# Patient Record
Sex: FEMALE | Race: WHITE | NOT HISPANIC OR LATINO | Employment: UNEMPLOYED | URBAN - METROPOLITAN AREA
[De-identification: names, ages, dates, MRNs, and addresses within clinical notes are randomized per-mention and may not be internally consistent; named-entity substitution may affect disease eponyms.]

---

## 2017-04-26 ENCOUNTER — TELEPHONE (OUTPATIENT)
Dept: OBSTETRICS AND GYNECOLOGY | Facility: CLINIC | Age: 52
End: 2017-04-26

## 2017-04-26 NOTE — TELEPHONE ENCOUNTER
Left a voice msg for pt to call [facility] regarding 6 month follow up appt from 8/4/16 Mammogram. Also called [facility] scheduling to see if they have reached pt yet. They have not heard from her to schedule follow up.

## 2018-10-25 ENCOUNTER — OFFICE VISIT (OUTPATIENT)
Dept: OBSTETRICS AND GYNECOLOGY | Facility: CLINIC | Age: 53
End: 2018-10-25

## 2018-10-25 VITALS
DIASTOLIC BLOOD PRESSURE: 84 MMHG | WEIGHT: 270.6 LBS | BODY MASS INDEX: 42.47 KG/M2 | SYSTOLIC BLOOD PRESSURE: 128 MMHG | HEIGHT: 67 IN

## 2018-10-25 DIAGNOSIS — Z78.0 MENOPAUSE: Primary | ICD-10-CM

## 2018-10-25 DIAGNOSIS — Z01.419 ENCOUNTER FOR GYNECOLOGICAL EXAMINATION WITHOUT ABNORMAL FINDING: ICD-10-CM

## 2018-10-25 PROCEDURE — G0101 CA SCREEN;PELVIC/BREAST EXAM: HCPCS | Performed by: OBSTETRICS & GYNECOLOGY

## 2018-10-25 RX ORDER — CYCLOBENZAPRINE HCL 5 MG
1 TABLET ORAL NIGHTLY
Refills: 0 | COMMUNITY
Start: 2018-10-03

## 2018-10-25 NOTE — PROGRESS NOTES
Chief Complaint   Patient presents with   • Gynecologic Exam       Nighat Oden is a 53 y.o. year old  presenting to be seen for her annual exam.  This patient has a history of a prior abdominal hysterectomy/right salpingo-oophorectomy.  She has had a left salpingo-oophorectomy.  She has had a cholecystectomy.  She does not take estrogen replacement therapy.  She denies menopausal symptoms.  She denies bowel or urinary symptoms.  She is treated for type 2 diabetes mellitus, hypertension, and dyslipidemia.    SCREENING TESTS    Year 2012 2016 2017   Age                         PAP                         HPV high risk                         Mammogram      benign                   CHLOÉ score                         Breast MRI                         Lipids                         Vitamin D                         Colonoscopy                         DEXA  Frax (hip/any)                         Ovarian Screen                             She exercises regularly: no.  She wears her seat belt: yes.  She has concerns about domestic violence: no.  She has noticed changes in height: no    GYN screening history:  · Last mammogram: was done on approximately 2017 and the result was: Birads II (Benign findings)..    No Additional Complaints Reported    The following portions of the patient's history were reviewed and updated as appropriate:vital signs and   She  has a past medical history of Disc disorder of thoracic region; Family history of breast cancer in mother; Menopause; Obesity; Osteoarthritis; and Sleep apnea with use of continuous positive airway pressure (CPAP).  She  does not have any pertinent problems on file.  She  has a past surgical history that includes Total abdominal hysterectomy w/ bilateral salpingoophorectomy (Right); Salpingoophorectomy (Left); Cervical discectomy; Cholecystectomy; and Breast  "biopsy.  Her family history includes Breast cancer in her mother; Cancer in her father; Diabetes in her father and mother; Pulmonary embolism in her father.  She  reports that she has quit smoking. She has never used smokeless tobacco. She reports that she does not drink alcohol or use drugs.  Current Outpatient Prescriptions   Medication Sig Dispense Refill   • Blood Glucose Monitoring Suppl (ONE TOUCH ULTRA MINI) W/DEVICE kit   0   • cyclobenzaprine (FLEXERIL) 5 MG tablet Take 1 tablet by mouth Every Night.  0   • EASY TOUCH LANCETS 30G/TWIST misc   3   • glimepiride (AMARYL) 4 MG tablet Take 1 tablet by mouth Daily.  3   • HYDROcodone-acetaminophen (NORCO) 7.5-325 MG per tablet Take 1 tablet by mouth As Needed.  0   • lisinopril (PRINIVIL,ZESTRIL) 5 MG tablet Take 1 tablet by mouth Daily.  3   • metFORMIN (GLUCOPHAGE) 500 MG tablet Take 1 tablet by mouth 2 (Two) Times a Day.  3   • ONE TOUCH ULTRA TEST test strip   3   • simvastatin (ZOCOR) 20 MG tablet Take 1 tablet by mouth Daily.  3     No current facility-administered medications for this visit.      She has No Known Allergies..    Review of Systems  A comprehensive review of systems was taken.  Constitutional: negative for fever, chills, activity change, appetite change, fatigue and unexpected weight change.  Respiratory: negative  Cardiovascular: negative  Gastrointestinal: negative  Genitourinary:negative  Musculoskeletal:positive for back pain  Behavioral/Psych: negative       /84   Ht 170.2 cm (67\")   Wt 123 kg (270 lb 9.6 oz)   LMP  (LMP Unknown)   BMI 42.38 kg/m²     Physical Exam    General:  alert; cooperative; well developed; well nourished  obese - Body mass index is 42.38 kg/m².   Skin:  No suspicious lesions seen   Thyroid: normal to inspection and palpation   Lungs:  clear to auscultation bilaterally   Heart:  regular rate and rhythm, S1, S2 normal, no murmur, click, rub or gallop   Breasts:  Examined in supine position  Symmetric " without masses or skin dimpling  Nipples normal without inversion, lesions or discharge  There are no palpable axillary nodes   Abdomen: soft, non-tender; no masses  no umbilical or inginual hernias are present  no hepato-splenomegaly   Pelvis: Clinical staff was present for exam  External genitalia:  normal appearance of the external genitalia including Bartholin's and [place]'s glands.  Vaginal:  normal pink mucosa without prolapse or lesions.  Cervix:  absent.  Uterus:  absent.  Adnexa:  absent, bilateral.  Rectal:  anus visually normal appearing. recto-vaginal exam unremarkable and confirms findings;     Lab Review   No data reviewed    Imaging  Mammogram results         ASSESSMENT  Problems Addressed this Visit        Genitourinary    Menopause - Primary      Other Visit Diagnoses     Encounter for gynecological examination without abnormal finding              PLAN    · Medications prescribed this encounter:  No orders of the defined types were placed in this encounter.  · Monthly self breast assessment and annual breast imaging  · Calcium, 600 mg/ Vit. D, 400 IU daily; regular weight-bearing exercise  · Follow up: 12 month(s)  *Please note that portions of this documentation may have been completed with a voice recognition program.  Efforts were made to edit this dictation, but occasional words may have been mistranscribed.       This note was electronically signed.    RAMBO Yi MD  October 25, 2018  4:13 PM

## 2022-03-04 ENCOUNTER — APPOINTMENT (OUTPATIENT)
Dept: GENERAL RADIOLOGY | Facility: HOSPITAL | Age: 57
End: 2022-03-04

## 2022-03-04 ENCOUNTER — HOSPITAL ENCOUNTER (INPATIENT)
Facility: HOSPITAL | Age: 57
LOS: 2 days | Discharge: HOME | End: 2022-03-08
Attending: EMERGENCY MEDICINE | Admitting: INTERNAL MEDICINE

## 2022-03-04 ENCOUNTER — APPOINTMENT (OUTPATIENT)
Dept: CT IMAGING | Facility: HOSPITAL | Age: 57
End: 2022-03-04

## 2022-03-04 DIAGNOSIS — Z86.39 HISTORY OF DIABETES MELLITUS: ICD-10-CM

## 2022-03-04 DIAGNOSIS — Z86.79 HISTORY OF HYPERTENSION: ICD-10-CM

## 2022-03-04 DIAGNOSIS — Z86.39 HISTORY OF HYPERLIPIDEMIA: ICD-10-CM

## 2022-03-04 DIAGNOSIS — Z82.49 FAMILY HISTORY OF CORONARY ARTERY DISEASE: ICD-10-CM

## 2022-03-04 DIAGNOSIS — R20.0 NUMBNESS AND TINGLING OF LEFT UPPER AND LOWER EXTREMITY: ICD-10-CM

## 2022-03-04 DIAGNOSIS — Z86.39 HISTORY OF OBESITY: ICD-10-CM

## 2022-03-04 DIAGNOSIS — R55 NEAR SYNCOPE: ICD-10-CM

## 2022-03-04 DIAGNOSIS — R06.02 SHORTNESS OF BREATH: ICD-10-CM

## 2022-03-04 DIAGNOSIS — R20.2 NUMBNESS AND TINGLING OF LEFT UPPER AND LOWER EXTREMITY: ICD-10-CM

## 2022-03-04 DIAGNOSIS — R07.89 ATYPICAL CHEST PAIN: Primary | ICD-10-CM

## 2022-03-04 PROBLEM — R07.9 CHEST PAIN: Status: ACTIVE | Noted: 2022-03-04

## 2022-03-04 PROBLEM — I10 ESSENTIAL HYPERTENSION: Status: ACTIVE | Noted: 2022-03-04

## 2022-03-04 PROBLEM — E78.5 HYPERLIPIDEMIA: Status: ACTIVE | Noted: 2022-03-04

## 2022-03-04 PROBLEM — E11.9 TYPE 2 DIABETES MELLITUS: Status: ACTIVE | Noted: 2022-03-04

## 2022-03-04 PROBLEM — E83.52 HYPERCALCEMIA: Status: ACTIVE | Noted: 2022-03-04

## 2022-03-04 PROBLEM — R74.8 ELEVATED LIVER ENZYMES: Status: ACTIVE | Noted: 2022-03-04

## 2022-03-04 LAB
ALBUMIN SERPL-MCNC: 4.9 G/DL (ref 3.5–5.2)
ALBUMIN/GLOB SERPL: 2 G/DL
ALP SERPL-CCNC: 100 U/L (ref 39–117)
ALT SERPL W P-5'-P-CCNC: 47 U/L (ref 1–33)
ANION GAP SERPL CALCULATED.3IONS-SCNC: 11 MMOL/L (ref 5–15)
AST SERPL-CCNC: 33 U/L (ref 1–32)
BASOPHILS # BLD AUTO: 0.04 10*3/MM3 (ref 0–0.2)
BASOPHILS NFR BLD AUTO: 0.4 % (ref 0–1.5)
BILIRUB SERPL-MCNC: <0.2 MG/DL (ref 0–1.2)
BUN SERPL-MCNC: 10 MG/DL (ref 6–20)
BUN/CREAT SERPL: 12.7 (ref 7–25)
CA-I SERPL ISE-MCNC: 1.42 MMOL/L (ref 1.12–1.32)
CALCIUM SPEC-SCNC: 10.6 MG/DL (ref 8.6–10.5)
CHLORIDE SERPL-SCNC: 106 MMOL/L (ref 98–107)
CK SERPL-CCNC: 70 U/L (ref 20–180)
CO2 SERPL-SCNC: 25 MMOL/L (ref 22–29)
CREAT SERPL-MCNC: 0.79 MG/DL (ref 0.57–1)
DEPRECATED RDW RBC AUTO: 39 FL (ref 37–54)
EGFRCR SERPLBLD CKD-EPI 2021: 88.5 ML/MIN/1.73
EOSINOPHIL # BLD AUTO: 0.15 10*3/MM3 (ref 0–0.4)
EOSINOPHIL NFR BLD AUTO: 1.6 % (ref 0.3–6.2)
ERYTHROCYTE [DISTWIDTH] IN BLOOD BY AUTOMATED COUNT: 12.4 % (ref 12.3–15.4)
FLUAV SUBTYP SPEC NAA+PROBE: NOT DETECTED
FLUBV RNA ISLT QL NAA+PROBE: NOT DETECTED
GLOBULIN UR ELPH-MCNC: 2.4 GM/DL
GLUCOSE SERPL-MCNC: 116 MG/DL (ref 65–99)
HBA1C MFR BLD: 8.8 % (ref 4.8–5.6)
HCT VFR BLD AUTO: 44.1 % (ref 34–46.6)
HGB BLD-MCNC: 14.6 G/DL (ref 12–15.9)
HOLD SPECIMEN: NORMAL
IMM GRANULOCYTES # BLD AUTO: 0.02 10*3/MM3 (ref 0–0.05)
IMM GRANULOCYTES NFR BLD AUTO: 0.2 % (ref 0–0.5)
LIPASE SERPL-CCNC: 24 U/L (ref 13–60)
LYMPHOCYTES # BLD AUTO: 3.26 10*3/MM3 (ref 0.7–3.1)
LYMPHOCYTES NFR BLD AUTO: 34.7 % (ref 19.6–45.3)
MAGNESIUM SERPL-MCNC: 2.2 MG/DL (ref 1.6–2.6)
MCH RBC QN AUTO: 28.6 PG (ref 26.6–33)
MCHC RBC AUTO-ENTMCNC: 33.1 G/DL (ref 31.5–35.7)
MCV RBC AUTO: 86.5 FL (ref 79–97)
MONOCYTES # BLD AUTO: 0.55 10*3/MM3 (ref 0.1–0.9)
MONOCYTES NFR BLD AUTO: 5.9 % (ref 5–12)
NEUTROPHILS NFR BLD AUTO: 5.38 10*3/MM3 (ref 1.7–7)
NEUTROPHILS NFR BLD AUTO: 57.2 % (ref 42.7–76)
NRBC BLD AUTO-RTO: 0 /100 WBC (ref 0–0.2)
NT-PROBNP SERPL-MCNC: 8.9 PG/ML (ref 0–900)
PLATELET # BLD AUTO: 233 10*3/MM3 (ref 140–450)
PMV BLD AUTO: 9 FL (ref 6–12)
POTASSIUM SERPL-SCNC: 4.6 MMOL/L (ref 3.5–5.2)
PROT SERPL-MCNC: 7.3 G/DL (ref 6–8.5)
RBC # BLD AUTO: 5.1 10*6/MM3 (ref 3.77–5.28)
SARS-COV-2 RNA PNL SPEC NAA+PROBE: NOT DETECTED
SODIUM SERPL-SCNC: 142 MMOL/L (ref 136–145)
TROPONIN T SERPL-MCNC: <0.01 NG/ML (ref 0–0.03)
TROPONIN T SERPL-MCNC: <0.01 NG/ML (ref 0–0.03)
TSH SERPL DL<=0.05 MIU/L-ACNC: 1.64 UIU/ML (ref 0.27–4.2)
WBC NRBC COR # BLD: 9.4 10*3/MM3 (ref 3.4–10.8)
WHOLE BLOOD HOLD SPECIMEN: NORMAL
WHOLE BLOOD HOLD SPECIMEN: NORMAL

## 2022-03-04 PROCEDURE — 70450 CT HEAD/BRAIN W/O DYE: CPT

## 2022-03-04 PROCEDURE — 83690 ASSAY OF LIPASE: CPT | Performed by: EMERGENCY MEDICINE

## 2022-03-04 PROCEDURE — 84484 ASSAY OF TROPONIN QUANT: CPT | Performed by: EMERGENCY MEDICINE

## 2022-03-04 PROCEDURE — 0 IOPAMIDOL PER 1 ML: Performed by: EMERGENCY MEDICINE

## 2022-03-04 PROCEDURE — 71275 CT ANGIOGRAPHY CHEST: CPT

## 2022-03-04 PROCEDURE — 82330 ASSAY OF CALCIUM: CPT | Performed by: PHYSICIAN ASSISTANT

## 2022-03-04 PROCEDURE — 87636 SARSCOV2 & INF A&B AMP PRB: CPT | Performed by: PHYSICIAN ASSISTANT

## 2022-03-04 PROCEDURE — 71045 X-RAY EXAM CHEST 1 VIEW: CPT

## 2022-03-04 PROCEDURE — 84443 ASSAY THYROID STIM HORMONE: CPT | Performed by: PHYSICIAN ASSISTANT

## 2022-03-04 PROCEDURE — 99223 1ST HOSP IP/OBS HIGH 75: CPT | Performed by: PHYSICIAN ASSISTANT

## 2022-03-04 PROCEDURE — 99285 EMERGENCY DEPT VISIT HI MDM: CPT

## 2022-03-04 PROCEDURE — 83735 ASSAY OF MAGNESIUM: CPT | Performed by: PHYSICIAN ASSISTANT

## 2022-03-04 PROCEDURE — 83036 HEMOGLOBIN GLYCOSYLATED A1C: CPT | Performed by: PHYSICIAN ASSISTANT

## 2022-03-04 PROCEDURE — 93005 ELECTROCARDIOGRAM TRACING: CPT | Performed by: EMERGENCY MEDICINE

## 2022-03-04 PROCEDURE — 99284 EMERGENCY DEPT VISIT MOD MDM: CPT

## 2022-03-04 PROCEDURE — 82550 ASSAY OF CK (CPK): CPT | Performed by: PHYSICIAN ASSISTANT

## 2022-03-04 PROCEDURE — 83880 ASSAY OF NATRIURETIC PEPTIDE: CPT | Performed by: EMERGENCY MEDICINE

## 2022-03-04 PROCEDURE — 85025 COMPLETE CBC W/AUTO DIFF WBC: CPT

## 2022-03-04 PROCEDURE — G0378 HOSPITAL OBSERVATION PER HR: HCPCS

## 2022-03-04 PROCEDURE — 80053 COMPREHEN METABOLIC PANEL: CPT | Performed by: EMERGENCY MEDICINE

## 2022-03-04 RX ORDER — ASPIRIN 81 MG/1
324 TABLET, CHEWABLE ORAL ONCE
Status: COMPLETED | OUTPATIENT
Start: 2022-03-04 | End: 2022-03-04

## 2022-03-04 RX ORDER — SODIUM CHLORIDE 0.9 % (FLUSH) 0.9 %
10 SYRINGE (ML) INJECTION AS NEEDED
Status: DISCONTINUED | OUTPATIENT
Start: 2022-03-04 | End: 2022-03-08 | Stop reason: HOSPADM

## 2022-03-04 RX ADMIN — ASPIRIN 81 MG 243 MG: 81 TABLET ORAL at 20:12

## 2022-03-04 RX ADMIN — IOPAMIDOL 69 ML: 755 INJECTION, SOLUTION INTRAVENOUS at 21:13

## 2022-03-05 ENCOUNTER — APPOINTMENT (OUTPATIENT)
Dept: CARDIOLOGY | Facility: HOSPITAL | Age: 57
End: 2022-03-05

## 2022-03-05 LAB
ANION GAP SERPL CALCULATED.3IONS-SCNC: 12 MMOL/L (ref 5–15)
BASOPHILS # BLD AUTO: 0.03 10*3/MM3 (ref 0–0.2)
BASOPHILS NFR BLD AUTO: 0.4 % (ref 0–1.5)
BH CV ECHO MEAS - AO MAX PG (FULL): 0.4 MMHG
BH CV ECHO MEAS - AO MAX PG: 5 MMHG
BH CV ECHO MEAS - AO MEAN PG (FULL): 0.64 MMHG
BH CV ECHO MEAS - AO MEAN PG: 3.3 MMHG
BH CV ECHO MEAS - AO ROOT AREA (BSA CORRECTED): 1.1
BH CV ECHO MEAS - AO ROOT AREA: 4.7 CM^2
BH CV ECHO MEAS - AO ROOT DIAM: 2.4 CM
BH CV ECHO MEAS - AO V2 MAX: 115.8 CM/SEC
BH CV ECHO MEAS - AO V2 MEAN: 86.6 CM/SEC
BH CV ECHO MEAS - AO V2 VTI: 21.3 CM
BH CV ECHO MEAS - AVA(I,A): 3.2 CM^2
BH CV ECHO MEAS - AVA(I,D): 3.2 CM^2
BH CV ECHO MEAS - AVA(V,A): 3 CM^2
BH CV ECHO MEAS - AVA(V,D): 3 CM^2
BH CV ECHO MEAS - BSA(HAYCOCK): 2.4 M^2
BH CV ECHO MEAS - BSA: 2.2 M^2
BH CV ECHO MEAS - BZI_BMI: 39.8 KILOGRAMS/M^2
BH CV ECHO MEAS - BZI_METRIC_HEIGHT: 170.2 CM
BH CV ECHO MEAS - BZI_METRIC_WEIGHT: 115.2 KG
BH CV ECHO MEAS - EDV(CUBED): 77.8 ML
BH CV ECHO MEAS - EDV(TEICH): 81.7 ML
BH CV ECHO MEAS - EF(CUBED): 62.9 %
BH CV ECHO MEAS - EF(TEICH): 54.8 %
BH CV ECHO MEAS - ESV(CUBED): 28.8 ML
BH CV ECHO MEAS - ESV(TEICH): 36.9 ML
BH CV ECHO MEAS - FS: 28.2 %
BH CV ECHO MEAS - IVS/LVPW: 1
BH CV ECHO MEAS - IVSD: 1.1 CM
BH CV ECHO MEAS - LA DIMENSION: 2.8 CM
BH CV ECHO MEAS - LA/AO: 1.1
BH CV ECHO MEAS - LAT PEAK E' VEL: 9.4 CM/SEC
BH CV ECHO MEAS - LATERAL E/E' RATIO: 6.9
BH CV ECHO MEAS - LV MASS(C)D: 153.3 GRAMS
BH CV ECHO MEAS - LV MASS(C)DI: 68.5 GRAMS/M^2
BH CV ECHO MEAS - LV MAX PG: 4.6 MMHG
BH CV ECHO MEAS - LV MEAN PG: 2.7 MMHG
BH CV ECHO MEAS - LV V1 MAX: 107.2 CM/SEC
BH CV ECHO MEAS - LV V1 MEAN: 76.6 CM/SEC
BH CV ECHO MEAS - LV V1 VTI: 21.2 CM
BH CV ECHO MEAS - LVIDD: 4.3 CM
BH CV ECHO MEAS - LVIDS: 3.1 CM
BH CV ECHO MEAS - LVOT AREA (M): 3.1 CM^2
BH CV ECHO MEAS - LVOT AREA: 3.2 CM^2
BH CV ECHO MEAS - LVOT DIAM: 2 CM
BH CV ECHO MEAS - LVPWD: 1.1 CM
BH CV ECHO MEAS - MED PEAK E' VEL: 8.1 CM/SEC
BH CV ECHO MEAS - MEDIAL E/E' RATIO: 8
BH CV ECHO MEAS - MV A MAX VEL: 61.3 CM/SEC
BH CV ECHO MEAS - MV DEC SLOPE: 357.3 CM/SEC^2
BH CV ECHO MEAS - MV DEC TIME: 0.21 SEC
BH CV ECHO MEAS - MV E MAX VEL: 64.7 CM/SEC
BH CV ECHO MEAS - MV E/A: 1.1
BH CV ECHO MEAS - MV MAX PG: 2.7 MMHG
BH CV ECHO MEAS - MV MEAN PG: 1.2 MMHG
BH CV ECHO MEAS - MV P1/2T MAX VEL: 78.7 CM/SEC
BH CV ECHO MEAS - MV P1/2T: 64.5 MSEC
BH CV ECHO MEAS - MV V2 MAX: 81.4 CM/SEC
BH CV ECHO MEAS - MV V2 MEAN: 49.7 CM/SEC
BH CV ECHO MEAS - MV V2 VTI: 21.8 CM
BH CV ECHO MEAS - MVA P1/2T LCG: 2.8 CM^2
BH CV ECHO MEAS - MVA(P1/2T): 3.4 CM^2
BH CV ECHO MEAS - MVA(VTI): 3.1 CM^2
BH CV ECHO MEAS - PA ACC TIME: 0.08 SEC
BH CV ECHO MEAS - PA PR(ACCEL): 43.3 MMHG
BH CV ECHO MEAS - SI(AO): 44.8 ML/M^2
BH CV ECHO MEAS - SI(CUBED): 21.9 ML/M^2
BH CV ECHO MEAS - SI(LVOT): 30.5 ML/M^2
BH CV ECHO MEAS - SI(TEICH): 20 ML/M^2
BH CV ECHO MEAS - SV(AO): 100.2 ML
BH CV ECHO MEAS - SV(CUBED): 49 ML
BH CV ECHO MEAS - SV(LVOT): 68.2 ML
BH CV ECHO MEAS - SV(TEICH): 44.7 ML
BH CV ECHO MEAS - TAPSE (>1.6): 2 CM
BH CV ECHO MEASUREMENTS AVERAGE E/E' RATIO: 7.39
BH CV XLRA - RV BASE: 3.1 CM
BH CV XLRA - RV LENGTH: 5.2 CM
BH CV XLRA - RV MID: 2.5 CM
BH CV XLRA - TDI S': 16.2 CM/SEC
BH CV XLRA MEAS LEFT DIST CCA EDV: 20.4 CM/SEC
BH CV XLRA MEAS LEFT DIST CCA PSV: 88.8 CM/SEC
BH CV XLRA MEAS LEFT DIST ICA EDV: 56.6 CM/SEC
BH CV XLRA MEAS LEFT DIST ICA PSV: 120.2 CM/SEC
BH CV XLRA MEAS LEFT ICA/CCA RATIO: 1.09
BH CV XLRA MEAS LEFT MID CCA EDV: 27.5 CM/SEC
BH CV XLRA MEAS LEFT MID CCA PSV: 102.9 CM/SEC
BH CV XLRA MEAS LEFT MID ICA EDV: 39.3 CM/SEC
BH CV XLRA MEAS LEFT MID ICA PSV: 99.8 CM/SEC
BH CV XLRA MEAS LEFT PROX CCA PSV: 99 CM/SEC
BH CV XLRA MEAS LEFT PROX ECA EDV: 16.5 CM/SEC
BH CV XLRA MEAS LEFT PROX ECA PSV: 111.6 CM/SEC
BH CV XLRA MEAS LEFT PROX ICA EDV: 33 CM/SEC
BH CV XLRA MEAS LEFT PROX ICA PSV: 84.9 CM/SEC
BH CV XLRA MEAS LEFT PROX SCLA EDV: 7.9 CM/SEC
BH CV XLRA MEAS LEFT PROX SCLA PSV: 165 CM/SEC
BH CV XLRA MEAS LEFT VERTEBRAL A EDV: 13.5 CM/SEC
BH CV XLRA MEAS LEFT VERTEBRAL A PSV: 52.8 CM/SEC
BH CV XLRA MEAS RIGHT DIST CCA EDV: 23.3 CM/SEC
BH CV XLRA MEAS RIGHT DIST CCA PSV: 86.7 CM/SEC
BH CV XLRA MEAS RIGHT DIST ICA EDV: 41.9 CM/SEC
BH CV XLRA MEAS RIGHT DIST ICA PSV: 122 CM/SEC
BH CV XLRA MEAS RIGHT ICA/CCA RATIO: 1.22
BH CV XLRA MEAS RIGHT MID CCA EDV: 25.1 CM/SEC
BH CV XLRA MEAS RIGHT MID CCA PSV: 99.9 CM/SEC
BH CV XLRA MEAS RIGHT MID ICA EDV: 48.4 CM/SEC
BH CV XLRA MEAS RIGHT MID ICA PSV: 99.3 CM/SEC
BH CV XLRA MEAS RIGHT PROX CCA EDV: 11.9 CM/SEC
BH CV XLRA MEAS RIGHT PROX CCA PSV: 99.3 CM/SEC
BH CV XLRA MEAS RIGHT PROX ECA EDV: 11.9 CM/SEC
BH CV XLRA MEAS RIGHT PROX ECA PSV: 84.2 CM/SEC
BH CV XLRA MEAS RIGHT PROX ICA EDV: 27 CM/SEC
BH CV XLRA MEAS RIGHT PROX ICA PSV: 72.9 CM/SEC
BH CV XLRA MEAS RIGHT PROX SCLA EDV: 11.8 CM/SEC
BH CV XLRA MEAS RIGHT PROX SCLA PSV: 127.7 CM/SEC
BH CV XLRA MEAS RIGHT VERTEBRAL A EDV: 11 CM/SEC
BH CV XLRA MEAS RIGHT VERTEBRAL A PSV: 47.9 CM/SEC
BUN SERPL-MCNC: 10 MG/DL (ref 6–20)
BUN/CREAT SERPL: 16.1 (ref 7–25)
CALCIUM SPEC-SCNC: 9.7 MG/DL (ref 8.6–10.5)
CHLORIDE SERPL-SCNC: 107 MMOL/L (ref 98–107)
CHOLEST SERPL-MCNC: 108 MG/DL (ref 0–200)
CO2 SERPL-SCNC: 19 MMOL/L (ref 22–29)
CREAT SERPL-MCNC: 0.62 MG/DL (ref 0.57–1)
DEPRECATED RDW RBC AUTO: 42.9 FL (ref 37–54)
EGFRCR SERPLBLD CKD-EPI 2021: 105.3 ML/MIN/1.73
EOSINOPHIL # BLD AUTO: 0.1 10*3/MM3 (ref 0–0.4)
EOSINOPHIL NFR BLD AUTO: 1.3 % (ref 0.3–6.2)
ERYTHROCYTE [DISTWIDTH] IN BLOOD BY AUTOMATED COUNT: 12.5 % (ref 12.3–15.4)
GLUCOSE BLDC GLUCOMTR-MCNC: 103 MG/DL (ref 70–130)
GLUCOSE BLDC GLUCOMTR-MCNC: 121 MG/DL (ref 70–130)
GLUCOSE BLDC GLUCOMTR-MCNC: 127 MG/DL (ref 70–130)
GLUCOSE BLDC GLUCOMTR-MCNC: 151 MG/DL (ref 70–130)
GLUCOSE SERPL-MCNC: 129 MG/DL (ref 65–99)
HCT VFR BLD AUTO: 43.6 % (ref 34–46.6)
HDLC SERPL-MCNC: 35 MG/DL (ref 40–60)
HGB BLD-MCNC: 13.4 G/DL (ref 12–15.9)
IMM GRANULOCYTES # BLD AUTO: 0.01 10*3/MM3 (ref 0–0.05)
IMM GRANULOCYTES NFR BLD AUTO: 0.1 % (ref 0–0.5)
LDLC SERPL CALC-MCNC: 50 MG/DL (ref 0–100)
LDLC/HDLC SERPL: 1.35 {RATIO}
LV EF 2D ECHO EST: 60 %
LYMPHOCYTES # BLD AUTO: 2.94 10*3/MM3 (ref 0.7–3.1)
LYMPHOCYTES NFR BLD AUTO: 37.9 % (ref 19.6–45.3)
MCH RBC QN AUTO: 28.9 PG (ref 26.6–33)
MCHC RBC AUTO-ENTMCNC: 30.7 G/DL (ref 31.5–35.7)
MCV RBC AUTO: 94.2 FL (ref 79–97)
MONOCYTES # BLD AUTO: 0.58 10*3/MM3 (ref 0.1–0.9)
MONOCYTES NFR BLD AUTO: 7.5 % (ref 5–12)
NEUTROPHILS NFR BLD AUTO: 4.09 10*3/MM3 (ref 1.7–7)
NEUTROPHILS NFR BLD AUTO: 52.8 % (ref 42.7–76)
NRBC BLD AUTO-RTO: 0 /100 WBC (ref 0–0.2)
PLATELET # BLD AUTO: 176 10*3/MM3 (ref 140–450)
PMV BLD AUTO: 8.8 FL (ref 6–12)
POTASSIUM SERPL-SCNC: 4.1 MMOL/L (ref 3.5–5.2)
QT INTERVAL: 372 MS
QT INTERVAL: 406 MS
QTC INTERVAL: 412 MS
QTC INTERVAL: 431 MS
RBC # BLD AUTO: 4.63 10*6/MM3 (ref 3.77–5.28)
SODIUM SERPL-SCNC: 138 MMOL/L (ref 136–145)
TRIGL SERPL-MCNC: 128 MG/DL (ref 0–150)
VLDLC SERPL-MCNC: 23 MG/DL (ref 5–40)
WBC NRBC COR # BLD: 7.75 10*3/MM3 (ref 3.4–10.8)

## 2022-03-05 PROCEDURE — G0378 HOSPITAL OBSERVATION PER HR: HCPCS

## 2022-03-05 PROCEDURE — 93880 EXTRACRANIAL BILAT STUDY: CPT | Performed by: INTERNAL MEDICINE

## 2022-03-05 PROCEDURE — 93306 TTE W/DOPPLER COMPLETE: CPT | Performed by: INTERNAL MEDICINE

## 2022-03-05 PROCEDURE — 93880 EXTRACRANIAL BILAT STUDY: CPT

## 2022-03-05 PROCEDURE — 82962 GLUCOSE BLOOD TEST: CPT

## 2022-03-05 PROCEDURE — 99222 1ST HOSP IP/OBS MODERATE 55: CPT | Performed by: INTERNAL MEDICINE

## 2022-03-05 PROCEDURE — 80061 LIPID PANEL: CPT | Performed by: PHYSICIAN ASSISTANT

## 2022-03-05 PROCEDURE — 80048 BASIC METABOLIC PNL TOTAL CA: CPT | Performed by: PHYSICIAN ASSISTANT

## 2022-03-05 PROCEDURE — 93306 TTE W/DOPPLER COMPLETE: CPT

## 2022-03-05 PROCEDURE — 99233 SBSQ HOSP IP/OBS HIGH 50: CPT | Performed by: HOSPITALIST

## 2022-03-05 PROCEDURE — 85025 COMPLETE CBC W/AUTO DIFF WBC: CPT | Performed by: PHYSICIAN ASSISTANT

## 2022-03-05 RX ORDER — LISINOPRIL 5 MG/1
5 TABLET ORAL DAILY
Status: DISCONTINUED | OUTPATIENT
Start: 2022-03-05 | End: 2022-03-08 | Stop reason: HOSPADM

## 2022-03-05 RX ORDER — ACETAMINOPHEN 325 MG/1
650 TABLET ORAL EVERY 6 HOURS PRN
Status: DISCONTINUED | OUTPATIENT
Start: 2022-03-05 | End: 2022-03-08 | Stop reason: HOSPADM

## 2022-03-05 RX ORDER — SODIUM CHLORIDE 0.9 % (FLUSH) 0.9 %
10 SYRINGE (ML) INJECTION AS NEEDED
Status: DISCONTINUED | OUTPATIENT
Start: 2022-03-05 | End: 2022-03-08 | Stop reason: HOSPADM

## 2022-03-05 RX ORDER — CHOLECALCIFEROL (VITAMIN D3) 125 MCG
5 CAPSULE ORAL NIGHTLY PRN
Status: DISCONTINUED | OUTPATIENT
Start: 2022-03-05 | End: 2022-03-08 | Stop reason: HOSPADM

## 2022-03-05 RX ORDER — SODIUM CHLORIDE 0.9 % (FLUSH) 0.9 %
10 SYRINGE (ML) INJECTION EVERY 12 HOURS SCHEDULED
Status: DISCONTINUED | OUTPATIENT
Start: 2022-03-05 | End: 2022-03-08 | Stop reason: HOSPADM

## 2022-03-05 RX ORDER — ONDANSETRON 2 MG/ML
4 INJECTION INTRAMUSCULAR; INTRAVENOUS EVERY 6 HOURS PRN
Status: DISCONTINUED | OUTPATIENT
Start: 2022-03-05 | End: 2022-03-08 | Stop reason: HOSPADM

## 2022-03-05 RX ORDER — CYCLOBENZAPRINE HCL 10 MG
5 TABLET ORAL NIGHTLY
Status: DISCONTINUED | OUTPATIENT
Start: 2022-03-05 | End: 2022-03-08 | Stop reason: HOSPADM

## 2022-03-05 RX ORDER — NICOTINE POLACRILEX 4 MG
15 LOZENGE BUCCAL
Status: DISCONTINUED | OUTPATIENT
Start: 2022-03-05 | End: 2022-03-08 | Stop reason: HOSPADM

## 2022-03-05 RX ORDER — ATORVASTATIN CALCIUM 10 MG/1
10 TABLET, FILM COATED ORAL DAILY
Status: DISCONTINUED | OUTPATIENT
Start: 2022-03-05 | End: 2022-03-08 | Stop reason: HOSPADM

## 2022-03-05 RX ORDER — DEXTROSE MONOHYDRATE 25 G/50ML
25 INJECTION, SOLUTION INTRAVENOUS
Status: DISCONTINUED | OUTPATIENT
Start: 2022-03-05 | End: 2022-03-08 | Stop reason: HOSPADM

## 2022-03-05 RX ORDER — ONDANSETRON 4 MG/1
4 TABLET, FILM COATED ORAL EVERY 6 HOURS PRN
Status: DISCONTINUED | OUTPATIENT
Start: 2022-03-05 | End: 2022-03-08 | Stop reason: HOSPADM

## 2022-03-05 RX ADMIN — ACETAMINOPHEN 650 MG: 325 TABLET ORAL at 21:27

## 2022-03-05 RX ADMIN — Medication 5 MG: at 21:27

## 2022-03-05 RX ADMIN — Medication 10 ML: at 21:27

## 2022-03-05 RX ADMIN — CYCLOBENZAPRINE 5 MG: 10 TABLET, FILM COATED ORAL at 19:58

## 2022-03-05 RX ADMIN — SODIUM CHLORIDE 1000 ML: 9 INJECTION, SOLUTION INTRAVENOUS at 04:59

## 2022-03-05 NOTE — PROGRESS NOTES
[facility] Medicine Services  PROGRESS NOTE    Patient Name: Nighat Oden  : 1965  MRN: [id number]    Date of Admission: 3/4/2022  Primary Care Physician: Fausto Powers MD    Subjective   Subjective     CC:  Chest Pain    HPI:  Patient lying in bed, states she is having on going chest discomfort with radiation to her neck and dizziness. Patient states she is having intermittent tachycardia. Overall, patient appears to not feel well. Carotid ultrasound underway while I was in the room. Denies n/v.    ROS:  Gen- No fevers, chills, +headache and dizziness  CV- + chest pain, palpitations  Resp- No cough, dyspnea  GI- No N/V/D, abd pain     Objective   Objective     Vital Signs:   Temp:  [97.7 °F (36.5 °C)-97.9 °F (36.6 °C)] 97.9 °F (36.6 °C)  Heart Rate:  [63-80] 67  Resp:  [16-17] 17  BP: ()/(47-98) 118/98     Physical Exam:  Constitutional: No acute distress, awake, alert, ill appearing  HENT: NCAT, mucous membranes moist  Respiratory: Clear to auscultation bilaterally, respiratory effort normal   Cardiovascular: RRR, no murmurs, rubs, or gallops  Gastrointestinal: Positive bowel sounds, soft, nontender, nondistended  Musculoskeletal: No bilateral ankle edema  Psychiatric: Appropriate affect, cooperative  Neurologic: Oriented x 3, strength symmetric in all extremities, Cranial Nerves grossly intact to confrontation, speech clear  Skin: No rashes    Results Reviewed:  LAB RESULTS:      Lab 22   WBC 7.75 9.40   HEMOGLOBIN 13.4 14.6   HEMATOCRIT 43.6 44.1   PLATELETS 176 233   NEUTROS ABS 4.09 5.38   IMMATURE GRANS (ABS) 0.01 0.02   LYMPHS ABS 2.94 3.26*   MONOS ABS 0.58 0.55   EOS ABS 0.10 0.15   MCV 94.2 86.5         Lab 22  2333 22   SODIUM 138  --  142   POTASSIUM 4.1  --  4.6   CHLORIDE 107  --  106   CO2 19.0*  --  25.0   ANION GAP 12.0  --  11.0   BUN 10  --  10   CREATININE 0.62  --  0.79   EGFR 105.3   --  88.5   GLUCOSE 129*  --  116*   CALCIUM 9.7  --  10.6*   IONIZED CALCIUM  --  1.42*  --    MAGNESIUM  --   --  2.2   HEMOGLOBIN A1C  --   --  8.80*   TSH  --   --  1.640         Lab 03/04/22 1928   TOTAL PROTEIN 7.3   ALBUMIN 4.90   GLOBULIN 2.4   ALT (SGPT) 47*   AST (SGOT) 33*   BILIRUBIN <0.2   ALK PHOS 100   LIPASE 24         Lab 03/04/22 2129 03/04/22 1928   PROBNP  --  8.9   TROPONIN T <0.010 <0.010         Lab 03/05/22  1109   CHOLESTEROL 108   LDL CHOL 50   HDL CHOL 35*   TRIGLYCERIDES 128             Brief Urine Lab Results     None          Microbiology Results Abnormal     Procedure Component Value - Date/Time    COVID PRE-OP / PRE-PROCEDURE SCREENING ORDER (NO ISOLATION) - Swab, Nasopharynx [[id number]]  (Normal) Collected: 03/04/22 2229    Lab Status: Final result Specimen: Swab from Nasopharynx Updated: 03/04/22 2259    Narrative:      The following orders were created for panel order COVID PRE-OP / PRE-PROCEDURE SCREENING ORDER (NO ISOLATION) - Swab, Nasopharynx.  Procedure                               Abnormality         Status                     ---------                               -----------         ------                     COVID-19 and FLU A/B PCR...[[id number]]  Normal              Final result                 Please view results for these tests on the individual orders.    COVID-19 and FLU A/B PCR - Swab, Nasopharynx [[id number]]  (Normal) Collected: 03/04/22 2229    Lab Status: Final result Specimen: Swab from Nasopharynx Updated: 03/04/22 2259     COVID19 Not Detected     Influenza A PCR Not Detected     Influenza B PCR Not Detected    Narrative:      Fact sheet for providers: https://www.fda.gov/media/919376/download    Fact sheet for patients: https://www.fda.gov/media/931852/download    Test performed by PCR.          CT Head Without Contrast    Result Date: 3/4/2022  DATE OF EXAM: 3/4/2022 8:52 PM  PROCEDURE: CT HEAD WO CONTRAST-  INDICATIONS: left sided numbness/tingling, r/o  acute CVA  COMPARISON: No comparisons available.  TECHNIQUE: Routine transaxial and coronal reconstruction images were obtained through the head without the administration of contrast. Automated exposure control and iterative reconstruction methods were used.  The radiation dose reduction device was turned on for each scan per the ALARA (As Low as Reasonably Achievable) protocol.  FINDINGS: The calvarium appears intact. Included paranasal sinuses and mastoids appear clear. Soft tissue window images show a mild degree of generalized cerebral atrophy within expected limits for the patient's age. There is no evidence of hemorrhage, contusion, edema, mass, mass effect, infarct, hydrocephalus, or abnormal extra-axial collection.      Impression: No evidence of acute intracranial disease.  This report was finalized on 3/4/2022 9:45 PM by Dr. Huang Roque MD.      XR Chest 1 View    Result Date: 3/4/2022  DATE OF EXAM: 3/4/2022 8:19 PM  PROCEDURE: XR CHEST 1 VW-  INDICATIONS: Chest Pain triage protocol  COMPARISON: No comparisons available.  TECHNIQUE: Single radiographic AP view of the chest was obtained.  FINDINGS: Heart mediastinum and pulmonary vasculature appear within normal limits. Lungs appear normally inflated and grossly clear. Previous cervical spine fusion is noted.      Impression: No evidence of active chest disease.  This report was finalized on 3/4/2022 8:56 PM by Dr. Huang Roque MD.      CT Angiogram Chest    Result Date: 3/4/2022  DATE OF EXAM: 3/4/2022 8:52 PM  PROCEDURE: CT ANGIOGRAM CHEST-  INDICATIONS: left sided CP, SOA, pain radiates to upper back, r/o PE  COMPARISON: No comparisons available.  TECHNIQUE: Contiguous axial imaging was obtained from the thoracic inlet through the upper abdomen following the intravenous administration of 69 mL of Isovue 370. Reconstructed coronal and sagittal images were also obtained. Automated exposure control and iterative reconstruction methods were used.  The  radiation dose reduction device was turned on for each scan per the ALARA (As Low as Reasonably Achievable) protocol.  FINDINGS: There is good contrast opacification of the pulmonary arteries. No filling defects are seen to suggest pulmonary embolic disease. There is no evidence of thoracic aortic aneurysm or dissection, pericardial or pleural effusion, mediastinal, hilar, or axillary adenopathy. No significant coronary artery calcification is seen. Lungs appear clear of active disease. A couple of minute granulomatous calcifications are incidentally noted. There is extensive fatty liver change. Gallbladder surgically absent. No upper abdominal inflammatory change or ascites is seen.       Impression: No evidence of pulmonary embolus, pneumonia or other active chest disease.  This report was finalized on 3/4/2022 9:56 PM by Dr. Huang Roque MD.            I have reviewed the medications:  Scheduled Meds:atorvastatin, 10 mg, Oral, Daily  cyclobenzaprine, 5 mg, Oral, Nightly  enoxaparin, 40 mg, Subcutaneous, Q24H  insulin lispro, 0-7 Units, Subcutaneous, TID AC  lisinopril, 5 mg, Oral, Daily  sodium chloride, 10 mL, Intravenous, Q12H      Continuous Infusions:   PRN Meds:.dextrose  •  dextrose  •  glucagon (human recombinant)  •  melatonin  •  ondansetron **OR** ondansetron  •  sodium chloride  •  sodium chloride    Assessment/Plan   Assessment & Plan     Active Hospital Problems    Diagnosis  POA   • Chest pain [R07.9]  Yes   • Elevated liver enzymes [R74.8]  Yes   • Type 2 diabetes mellitus (HCC) [E11.9]  Yes   • Essential hypertension [I10]  Yes   • Hyperlipidemia [E78.5]  Yes   • Hypercalcemia [E83.52]  Yes      Resolved Hospital Problems   No resolved problems to display.        Brief Hospital Course to date:  Nighat Oden is a 56 y.o. female with a history of type 2 diabetes, hypertension, hyperlipidemia, anxiety who presents to [facility] ED with complaint of intermittent chest pain for 3 weeks. Is  currently being worked up by, Dr. Roland Nelson at [facility] and just turned in Holter monitor. Had a chemical stress test week before last but hasn't heard back on that. Currently chest pain free.    This patient's problems and plans were partially entered by my partner and updated as appropriate by me 03/05/22.    All problems are new to me today.     Chest Pain  -Patient currently complains of ongoing chest pain with radiation to the neck and dizziness.  - vitals and BP stable.  -CXR unremarkable.  -CTA chest unremarkable  -Troponin negative x2.  -EKG shows no ischemic ST changes.  -Mag normal  -ECHO pending  -Cardiology consulted  - given in the ED  -N.p.o. until seen by cardiology  -Monitor on telemetry  -We will get a carotid duplex performed today negative.  - Ongoing dizziness with some possible intermittent paresthesia with these episodes.   -CT head without contrast shows no acute intracranial disease.     Hypertension  Hyperlipidemia  -Continue home lisinopril  -Continue statin  -Lipid panel within normal limits     Type 2 diabetes mellitus  -Blood glucose 116 on arrival  -HgA1C: 8.8  -Hold home oral meds  -Fingerstick ACHS. SSI     Elevated liver enzymes  -Hepatitis panel pending.  -May consider RUQ ultrasound.     Hypercalcemia  -Check ionized calcium  -Check Vit D25 hydroxy, and D 1,25 Dihydroxy    DVT prophylaxis:  Medical DVT prophylaxis orders are present.       AM-PAC 6 Clicks Score (PT): 24 (03/05/22 0800)    Disposition: I expect the patient to be discharged TBD.    CODE STATUS:   Code Status and Medical Interventions:   Ordered at: 03/05/22 0013     Code Status (Patient has no pulse and is not breathing):    CPR (Attempt to Resuscitate)     Medical Interventions (Patient has pulse or is breathing):    Full Support       Neda Mroton DO  03/05/22

## 2022-03-05 NOTE — PLAN OF CARE
Problem: Chest Pain  Goal: Resolution of Chest Pain Symptoms  Outcome: Ongoing, Progressing  Intervention: Manage Acute Chest Pain  Description: Note pain quality, characteristics, location, duration and associated signs and symptoms that may include diaphoresis, radiation, nausea and vomiting; assess aggravating and relieving factors.  Reassess pain frequently to determine effect of activity and interventions; use a consistent pain scale.  Prepare patient for diagnostic testing to determine pain source that may include an electrocardiogram, biochemical markers, computed tomography or chest x-ray.  Provide oxygen therapy judiciously if hypoxemia is present.  Anticipate aspirin administration, if not contraindicated.  Titrate medication in response to patient status; minimize the amount of medication needed to control symptoms.  Flowsheets (Taken 3/5/2022 1552)  Chest Pain Intervention:   cardiac biomarkers drawn   12-lead ECG obtained     Problem: Adult Inpatient Plan of Care  Goal: Absence of Hospital-Acquired Illness or Injury  Intervention: Prevent Skin Injury  Description: Perform a screening for skin injury risk, such as pressure or moisture associated skin damage on admission and at regular intervals throughout hospital stay.  Keep all areas of skin (especially folds) clean and dry.  Maintain adequate skin hydration.  Relieve and redistribute pressure and protect bony prominences; implement measures based on patient-specific risk factors.  Match turning and repositioning schedule to clinical condition.  Encourage weight shift frequently; assist with reposition if unable to complete independently.  Float heels off bed; avoid pressure on the Achilles tendon.  Keep skin free from extended contact with medical devices.  Encourage functional activity and mobility, as early as tolerated.  Use aids (e.g., slide boards, mechanical lift) during transfer.  Recent Flowsheet Documentation  Taken 3/5/2022 0800 by Beatris  Jaskaran VELEZ LPN  Body Position: position changed independently  Intervention: Prevent and Manage VTE (Venous Thromboembolism) Risk  Description: Assess for VTE (venous thromboembolism) risk.  Encourage and assist with early ambulation.  Initiate and maintain compression or other therapy, as indicated, based on identified risk in accordance with organizational protocol and provider order.  Encourage both active and passive leg exercises while in bed, if unable to ambulate.  Recent Flowsheet Documentation  Taken 3/5/2022 0800 by Jaskaran Spear LPN  Activity Management: activity encouraged   Goal Outcome Evaluation:

## 2022-03-05 NOTE — CONSULTS
[facility] at [facility]   Consult Note    Referring Provider: AGUSTIN Macias    Reason for Consultation: chest pain    Patient Care Team:  Fausto Powers MD as PCP - General (Internal Medicine)  Tristan Yi MD (Inactive) as Consulting Physician (Gynecology)     Problem List:  1. Chest pain  1. Normal echo 3/5/22 and normal MPS 2/24/22  2. Hypertension  3. Diabetes mellitus  4. Dyslipidemia   5. COVID-19 11/2021  6. Obesity  7. T-spine disc compression  8. Arthritis  9. Sleep apnea on CPAP  10. History of nephrolithiasis  11. Surgeries:  1. Breast biopsy  2. Cervical discectomy  3. Cholecystectomy  4. Total hysterectomy   5. Bladder surgery  6. Left wrist surgery  7. Bilateral knee arthroscopy      No Known Allergies        Current Facility-Administered Medications:   •  atorvastatin (LIPITOR) tablet 10 mg, 10 mg, Oral, Daily, Dianelys Macias PA-C  •  cyclobenzaprine (FLEXERIL) tablet 5 mg, 5 mg, Oral, Nightly, Dianelys Macias PA-C  •  dextrose (D50W) (25 g/50 mL) IV injection 25 g, 25 g, Intravenous, Q15 Min PRN, Dianelys Macias PA-C  •  dextrose (GLUTOSE) oral gel 15 g, 15 g, Oral, Q15 Min PRN, Dianelys Macias PA-C  •  enoxaparin (LOVENOX) syringe 40 mg, 40 mg, Subcutaneous, Q24H, Dianelys Macias PA-C  •  glucagon (human recombinant) (GLUCAGEN DIAGNOSTIC) injection 1 mg, 1 mg, Intramuscular, Q15 Min PRN, Dianelys Macias PA-C  •  insulin lispro (humaLOG) injection 0-7 Units, 0-7 Units, Subcutaneous, TID AC, Dianelys Macias PA-C  •  lisinopril (PRINIVIL,ZESTRIL) tablet 5 mg, 5 mg, Oral, Daily, Dianelys Macias PA-C  •  melatonin tablet 5 mg, 5 mg, Oral, Nightly PRN, Dianelys Macias PA-C  •  ondansetron (ZOFRAN) tablet 4 mg, 4 mg, Oral, Q6H PRN **OR** ondansetron (ZOFRAN) injection 4 mg, 4 mg, Intravenous, Q6H PRN, Dianelys Macias PA-C  •  sodium chloride 0.9 % flush 10 mL, 10 mL, Intravenous, PRN,  Tian Ruiz, DO  •  sodium chloride 0.9 % flush 10 mL, 10 mL, Intravenous, Q12H, Dianelys Macias PA-C  •  sodium chloride 0.9 % flush 10 mL, 10 mL, Intravenous, PRN, Dianelys Macias PA-C         Medications Prior to Admission   Medication Sig Dispense Refill Last Dose   • Blood Glucose Monitoring Suppl (ONE TOUCH ULTRA MINI) W/DEVICE kit   0    • cyclobenzaprine (FLEXERIL) 5 MG tablet Take 1 tablet by mouth Every Night.  0 3/3/2022   • EASY TOUCH LANCETS 30G/TWIST misc   3    • glimepiride (AMARYL) 4 MG tablet Take 1 tablet by mouth Daily.  3    • HYDROcodone-acetaminophen (NORCO) 7.5-325 MG per tablet Take 1 tablet by mouth As Needed.  0    • lisinopril (PRINIVIL,ZESTRIL) 5 MG tablet Take 1 tablet by mouth Daily.  3    • metFORMIN (GLUCOPHAGE) 500 MG tablet Take 1 tablet by mouth 2 (Two) Times a Day.  3    • ONE TOUCH ULTRA TEST test strip   3    • simvastatin (ZOCOR) 20 MG tablet Take 1 tablet by mouth Daily.  3          Subjective .   History of present illness:    Patient is a 56-year-old  female who is being seen today for further evaluation of chest pain and dizziness.  She has no previous history of coronary disease but does have multiple risk factors.  Patient notes over the last few months recurrent chest pain and dizziness and feeling as if she is going to pass out.  She has had a Holter monitor performed which she mailed back last week.  Currently results are not available.  She also had a myocardial perfusion study done last month which was negative for ischemia.  Typically her symptoms occur at rest.  She notes a dizzy sensation and then a flush sensation that goes across her and almost like ice water down her left side.  Has not noted any significant exertional symptoms.  No true loss of consciousness.  Cardiac studies thus far have been negative.      Social History     Socioeconomic History   • Marital status:    Tobacco Use   • Smoking status: Former  "Smoker   • Smokeless tobacco: Never Used   Substance and Sexual Activity   • Alcohol use: No   • Drug use: No   • Sexual activity: Yes     Partners: Male     Birth control/protection: Surgical     Family History   Problem Relation Age of Onset   • Diabetes Father    • Pulmonary embolism Father    • Cancer Father    • Breast cancer Mother    • Diabetes Mother          Review of Systems:  Review of Systems   Constitutional: Positive for malaise/fatigue and night sweats. Negative for fever.   HENT: Negative for nosebleeds.    Eyes: Negative for redness and visual disturbance.   Cardiovascular: Positive for chest pain. Negative for orthopnea, palpitations and paroxysmal nocturnal dyspnea.   Respiratory: Positive for shortness of breath. Negative for cough, snoring, sputum production and wheezing.    Hematologic/Lymphatic: Negative for bleeding problem.   Skin: Negative for flushing, itching and rash.   Musculoskeletal: Positive for arthritis. Negative for falls, joint pain and muscle cramps.   Gastrointestinal: Negative for abdominal pain, diarrhea, heartburn, nausea and vomiting.   Genitourinary: Negative for hematuria.   Neurological: Positive for dizziness. Negative for excessive daytime sleepiness, headaches, tremors and weakness.   Psychiatric/Behavioral: Negative for substance abuse. The patient is nervous/anxious.               Objective   Vitals:  /81 (BP Location: Right arm, Patient Position: Lying)   Pulse 101   Temp 97.9 °F (36.6 °C) (Oral)   Resp 17   Ht 170.2 cm (67\")   Wt 115 kg (254 lb 8 oz)   LMP  (LMP Unknown)   SpO2 98%   BMI 39.86 kg/m²        Vitals reviewed.   Constitutional:       Appearance: Well-developed and not in distress.      Comments: Obese   Neck:      Vascular: No JVD.      Trachea: No tracheal deviation.   Pulmonary:      Effort: Pulmonary effort is normal.      Breath sounds: Normal breath sounds.   Cardiovascular:      Normal rate. Regular rhythm.   Pulses:     Intact " distal pulses.   Edema:     Peripheral edema absent.   Abdominal:      General: Bowel sounds are normal.      Palpations: Abdomen is soft.      Tenderness: There is no abdominal tenderness.   Musculoskeletal:         General: No deformity. Skin:     General: Skin is warm and dry.   Neurological:      Mental Status: Alert and oriented to person, place, and time.     I have examined the patient and agree with the above           Results Review:  I reviewed the patient's new clinical results.  Results from last 7 days   Lab Units 03/05/22  1109   WBC 10*3/mm3 7.75   HEMOGLOBIN g/dL 13.4   HEMATOCRIT % 43.6   PLATELETS 10*3/mm3 176     Results from last 7 days   Lab Units 03/05/22  1109 03/04/22  1928   SODIUM mmol/L 138 142   POTASSIUM mmol/L 4.1 4.6   CHLORIDE mmol/L 107 106   CO2 mmol/L 19.0* 25.0   BUN mg/dL 10 10   CREATININE mg/dL 0.62 0.79   CALCIUM mg/dL 9.7 10.6*   BILIRUBIN mg/dL  --  <0.2   ALK PHOS U/L  --  100   ALT (SGPT) U/L  --  47*   AST (SGOT) U/L  --  33*   GLUCOSE mg/dL 129* 116*     Results from last 7 days   Lab Units 03/05/22  1109   SODIUM mmol/L 138   POTASSIUM mmol/L 4.1   CHLORIDE mmol/L 107   CO2 mmol/L 19.0*   BUN mg/dL 10   CREATININE mg/dL 0.62   GLUCOSE mg/dL 129*   CALCIUM mg/dL 9.7         Lab Results   Lab Value Date/Time    TROPONINT <0.010 03/04/2022 2129    TROPONINT <0.010 03/04/2022 1928     Results from last 7 days   Lab Units 03/04/22  1928   TSH uIU/mL 1.640     Results from last 7 days   Lab Units 03/05/22  1109   CHOLESTEROL mg/dL 108   TRIGLYCERIDES mg/dL 128   HDL CHOL mg/dL 35*   LDL CHOL mg/dL 50     Results from last 7 days   Lab Units 03/04/22 1928   PROBNP pg/mL 8.9     ECHO:  · Estimated left ventricular EF = 60%  · The cardiac valves are anatomically and functionally normal.      Tele:  SR    EKG: SR no acute changes.       Assessment/Plan     1. Chest pain, atypical.  Normal [place] scan done at [facility] last month.  Echocardiogram overall normal.  No noted  "carotid disease.  2. Recurrent dizziness, unclear etiology.  Had recent event monitor which we do not currently have results of.  Will likely not be able to get until next week.  3. Hypertension, stable.  Reported hypotension at home.  May need less medical therapy at time of discharge.      Plan:    1. Unclear cause of symptoms at this time.  Normal stress test echocardiogram and carotid duplex.  (Have reviewed the results of the stress test done at [facility] last week).  2. Cardiac monitor is pending, we are unlikely to have the results of this for at least another week.  3. Despite his \"mild\" symptoms, there is been no arrhythmia noted on monitor this admission  4. We will check orthostatics ensure this is the cause of her symptoms  5. Consider screening for pheochromocytoma or carcinoid with 5 HIAA.  Although this is unlikely.  6. However, overall from cardiac standpoint no significant abnormalities.  Monitor could be followed up on as an outpatient basis.    FORREST Preston obtained past medical, family history, social history, review of systems and functioned as a scribe for the remainder of the dictation for Dr. Urbina.     I have examined the patient and agree with the abov  I have seen and examined the patient, I have reviewed the note, discussed the case with the advance practice clinician, made necessary changes and I agree with the final note.    Fabien Urbina MD  03/05/22  19:13 EST          Dictated utilizing Dragon dictation    "

## 2022-03-05 NOTE — ED PROVIDER NOTES
Subjective   This is a 56-year-old female that presents the ER with waxing and waning, persistent left-sided chest pain for the last 3 weeks.  Patient describes it as worse at rest, either with lying or sitting.  It is not worsened with exertion.  She describes it as sharp when it occurs and it radiates to the left shoulder and straight through to the left upper back as well as to the left lower and lateral rib cage.  She denies any skin lesions or rash.  She does report associated shortness of breath, dizziness, and near syncope.  Patient has been following with cardiologist in [place], [place], Dr. Roland Neslon.  She had a stress test a little over a week ago but has not heard about those results.  She also turned in a Holter monitor yesterday to the office but has not heard any results.  Patient is scheduled for outpatient echocardiogram in the near future.  Patient is a non-smoker.  She denies personal history of asthma or COPD.  Past medical history is significant for obesity with BMI of 39, chronic pain related to degenerative disc disease, sleep apnea with use of CPAP, hypertension, and type 2 diabetes mellitus, and hyperlipidemia.  Patient reports positive family history of CAD and 2 of her paternal uncles as well as history of hypertension in her father and history of CHF in her paternal grandfather.  Patient also reports increased symptoms of numbness and tingling to the left side of her body as well as a sensation that cold water is being dumped on her on the left side.  She denies any coolness to touch to the left lower extremity or left upper extremity.  She denies any change in coloration.  She denies any weakness on the left side.  She denies any speech changes or headache.  She denies visual changes.  She denies any recent new medication changes.  No other concerns at this time.      History provided by:  Patient  Chest Pain  Pain location:  L chest  Pain quality: sharp    Pain radiates to:  L  "shoulder and upper back (left lateral ribs.)  Timing:  Intermittent (Occurring on a daily basis.)  Progression:  Waxing and waning  Chronicity:  Recurrent  Context comment:  Pt reports 3wk h/o left sided CP, sharp, with radiation to left shoulder and left ribs.  Associated SOA, dizzy, and near syncopal.  Seeing Cards in [place] and hasn't finished work-up yet. Numbness/tingling and feels like \"cold water\" dumped on her.  Exacerbated by: Sxs occur at rest either with lying or sitting.  Associated symptoms: anorexia, back pain (increased pain between her shoulder blades.), dizziness, fatigue, near-syncope, numbness, palpitations, shortness of breath and weakness (generally weak)    Associated symptoms: no abdominal pain, no anxiety, no cough, no diaphoresis, no fever, no headache, no heartburn, no lower extremity edema, no nausea and no vomiting    Risk factors comment:  Was seeing Dr. Roland Nelson at [facility] and just turned in Holter monitor. Had a chemical stress test week before last but hasn't heard back on that. Had Covid-19 11/12/21.      Review of Systems   Constitutional: Positive for activity change, appetite change and fatigue. Negative for diaphoresis and fever.   HENT: Negative.  Negative for congestion, ear pain, postnasal drip, rhinorrhea, sinus pressure, sinus pain, sneezing and sore throat.         Non-vaccinated.  Had Covid-19 in 11/21.   Respiratory: Positive for shortness of breath. Negative for cough.    Cardiovascular: Positive for chest pain, palpitations and near-syncope.   Gastrointestinal: Positive for anorexia. Negative for abdominal pain, constipation, diarrhea, heartburn, nausea and vomiting.   Genitourinary: Negative.  Negative for dysuria, flank pain, frequency and urgency.   Musculoskeletal: Positive for back pain (increased pain between her shoulder blades.).   Skin: Negative.  Negative for rash.   Neurological: Positive for dizziness, weakness (generally weak) and numbness. " Negative for syncope, speech difficulty and headaches.   All other systems reviewed and are negative.      Past Medical History:   Diagnosis Date   • Disc disorder of thoracic region     compressed disc T10-T11   • Family history of breast cancer in mother    • Menopause    • Obesity    • Osteoarthritis    • Sleep apnea with use of continuous positive airway pressure (CPAP)        No Known Allergies    Past Surgical History:   Procedure Laterality Date   • BREAST BIOPSY     • CERVICAL DISCECTOMY ANTERIOR     • CHOLECYSTECTOMY     • SALPINGO OOPHORECTOMY Left    • TOTAL ABDOMINAL HYSTERECTOMY WITH SALPINGO OOPHORECTOMY Right        Family History   Problem Relation Age of Onset   • Diabetes Father    • Pulmonary embolism Father    • Cancer Father    • Breast cancer Mother    • Diabetes Mother        Social History     Socioeconomic History   • Marital status:    Tobacco Use   • Smoking status: Former Smoker   • Smokeless tobacco: Never Used   Substance and Sexual Activity   • Alcohol use: No   • Drug use: No   • Sexual activity: Yes     Partners: Male     Birth control/protection: Surgical           Objective   Physical Exam  Vitals and nursing note reviewed.   Constitutional:       Appearance: Normal appearance.   HENT:      Head: Normocephalic and atraumatic.      Right Ear: Tympanic membrane normal.      Left Ear: Tympanic membrane normal.      Nose: Nose normal.      Mouth/Throat:      Mouth: Mucous membranes are moist.      Pharynx: Oropharynx is clear.   Eyes:      Extraocular Movements: Extraocular movements intact.      Conjunctiva/sclera: Conjunctivae normal.      Pupils: Pupils are equal, round, and reactive to light.   Neck:      Thyroid: No thyromegaly or thyroid tenderness.      Vascular: No carotid bruit.      Comments: No thyromegaly.  No carotid bruits bilaterally.  Cardiovascular:      Rate and Rhythm: Normal rate and regular rhythm.  No extrasystoles are present.     Pulses: Normal pulses.       Heart sounds: Normal heart sounds. No murmur heard.     Comments: Regular rate and rhythm.  No ectopy.  No murmurs appreciated.  No pedal edema to lower extremities.  Pulmonary:      Effort: Pulmonary effort is normal. No tachypnea or accessory muscle usage.      Breath sounds: Normal breath sounds. No decreased breath sounds, wheezing or rales.      Comments: No tachypnea or retractions.  Lungs are clear to auscultation bilaterally.  Chest:      Chest wall: Tenderness present. No swelling or crepitus.      Comments: Patient does have some localized tenderness to the left anterior chest wall and left lower rib border.  There are no skin lesions or rash appreciated.  Abdominal:      General: Bowel sounds are normal. There is no distension.      Palpations: Abdomen is soft.      Tenderness: There is no abdominal tenderness. There is no right CVA tenderness, left CVA tenderness, guarding or rebound.      Comments: Central obesity.  Soft and nontender.  No flank or CVA tenderness.   Musculoskeletal:         General: Normal range of motion.      Cervical back: Normal range of motion and neck supple.      Right lower leg: No edema.      Left lower leg: No edema.   Skin:     General: Skin is warm and dry.   Neurological:      General: No focal deficit present.      Mental Status: She is alert.      Cranial Nerves: Cranial nerves are intact.      Sensory: Sensation is intact.      Motor: Motor function is intact.      Coordination: Coordination is intact.      Comments: Neuro intact and nonfocal.   Psychiatric:         Mood and Affect: Mood is anxious.         Speech: Speech normal.         Behavior: Behavior normal. Behavior is cooperative.         Thought Content: Thought content normal.         Cognition and Memory: Cognition normal.         Judgment: Judgment normal.      Comments: Anxious.         Procedures           ED Course  ED Course as of 03/05/22 0450   Sat Mar 05, 2022   0343 EKGs x2 show normal sinus rhythm.   No acute ST-T wave changes consistent with ischemia.  CBC and chemistries were essentially normal.  LFTs were mildly elevated with ALT 47, AST 33.  BNP was 8.9.  Troponins x2 sets are less than 0.010.  Creatinine kinase of 70, lipase is 24.  Hemoglobin A1c is 8.8%.  COVID-19 testing was negative and influenza testing was negative.  Troponins x2 sets are less than 0.010.  CT of the head without contrast reveals no acute intracranial abnormality.  CT angiogram of the chest with contrast reveals no PE, infiltrative process, or other acute intrathoracic process.  O2 sat was 98 to 99% on room air.  Vital signs are otherwise stable.  Patient has no neurologic deficits.  Heart score is 3.  Discussed admission with Dr. Valladares, hospitalist, for cardiac rule out per MI protocol as well as cardiology consultation and close neuro checks due to left-sided numbness and tingling.  Patient would benefit from carotid ultrasound.  Dr. Valladares is agreeable to admission on telemetry.  I updated patient and spouse on all results and they are very appreciative. [FC]      ED Course User Index  [FC] Pamela Betts, REID           Recent Results (from the past 24 hour(s))   ECG 12 Lead    Collection Time: 03/04/22  7:23 PM   Result Value Ref Range    QT Interval 372 ms    QTC Interval 412 ms   Troponin    Collection Time: 03/04/22  7:28 PM    Specimen: Blood   Result Value Ref Range    Troponin T <0.010 0.000 - 0.030 ng/mL   Comprehensive Metabolic Panel    Collection Time: 03/04/22  7:28 PM    Specimen: Blood   Result Value Ref Range    Glucose 116 (H) 65 - 99 mg/dL    BUN 10 6 - 20 mg/dL    Creatinine 0.79 0.57 - 1.00 mg/dL    Sodium 142 136 - 145 mmol/L    Potassium 4.6 3.5 - 5.2 mmol/L    Chloride 106 98 - 107 mmol/L    CO2 25.0 22.0 - 29.0 mmol/L    Calcium 10.6 (H) 8.6 - 10.5 mg/dL    Total Protein 7.3 6.0 - 8.5 g/dL    Albumin 4.90 3.50 - 5.20 g/dL    ALT (SGPT) 47 (H) 1 - 33 U/L    AST (SGOT) 33 (H) 1 - 32 U/L    Alkaline Phosphatase  100 39 - 117 U/L    Total Bilirubin <0.2 0.0 - 1.2 mg/dL    Globulin 2.4 gm/dL    A/G Ratio 2.0 g/dL    BUN/Creatinine Ratio 12.7 7.0 - 25.0    Anion Gap 11.0 5.0 - 15.0 mmol/L    eGFR 88.5 >60.0 mL/min/1.73   Lipase    Collection Time: 03/04/22  7:28 PM    Specimen: Blood   Result Value Ref Range    Lipase 24 13 - 60 U/L   BNP    Collection Time: 03/04/22  7:28 PM    Specimen: Blood   Result Value Ref Range    proBNP 8.9 0.0 - 900.0 pg/mL   Green Top (Gel)    Collection Time: 03/04/22  7:28 PM   Result Value Ref Range    Extra Tube Hold for add-ons.    Lavender Top    Collection Time: 03/04/22  7:28 PM   Result Value Ref Range    Extra Tube hold for add-on    Gold Top - SST    Collection Time: 03/04/22  7:28 PM   Result Value Ref Range    Extra Tube Hold for add-ons.    Gray Top    Collection Time: 03/04/22  7:28 PM   Result Value Ref Range    Extra Tube Hold for add-ons.    Light Blue Top    Collection Time: 03/04/22  7:28 PM   Result Value Ref Range    Extra Tube hold for add-on    CBC Auto Differential    Collection Time: 03/04/22  7:28 PM    Specimen: Blood   Result Value Ref Range    WBC 9.40 3.40 - 10.80 10*3/mm3    RBC 5.10 3.77 - 5.28 10*6/mm3    Hemoglobin 14.6 12.0 - 15.9 g/dL    Hematocrit 44.1 34.0 - 46.6 %    MCV 86.5 79.0 - 97.0 fL    MCH 28.6 26.6 - 33.0 pg    MCHC 33.1 31.5 - 35.7 g/dL    RDW 12.4 12.3 - 15.4 %    RDW-SD 39.0 37.0 - 54.0 fl    MPV 9.0 6.0 - 12.0 fL    Platelets 233 140 - 450 10*3/mm3    Neutrophil % 57.2 42.7 - 76.0 %    Lymphocyte % 34.7 19.6 - 45.3 %    Monocyte % 5.9 5.0 - 12.0 %    Eosinophil % 1.6 0.3 - 6.2 %    Basophil % 0.4 0.0 - 1.5 %    Immature Grans % 0.2 0.0 - 0.5 %    Neutrophils, Absolute 5.38 1.70 - 7.00 10*3/mm3    Lymphocytes, Absolute 3.26 (H) 0.70 - 3.10 10*3/mm3    Monocytes, Absolute 0.55 0.10 - 0.90 10*3/mm3    Eosinophils, Absolute 0.15 0.00 - 0.40 10*3/mm3    Basophils, Absolute 0.04 0.00 - 0.20 10*3/mm3    Immature Grans, Absolute 0.02 0.00 - 0.05 10*3/mm3     nRBC 0.0 0.0 - 0.2 /100 WBC   TSH    Collection Time: 03/04/22  7:28 PM    Specimen: Blood   Result Value Ref Range    TSH 1.640 0.270 - 4.200 uIU/mL   CK    Collection Time: 03/04/22  7:28 PM    Specimen: Blood   Result Value Ref Range    Creatine Kinase 70 20 - 180 U/L   Magnesium    Collection Time: 03/04/22  7:28 PM    Specimen: Blood   Result Value Ref Range    Magnesium 2.2 1.6 - 2.6 mg/dL   Hemoglobin A1c    Collection Time: 03/04/22  7:28 PM    Specimen: Blood   Result Value Ref Range    Hemoglobin A1C 8.80 (H) 4.80 - 5.60 %   ECG 12 Lead    Collection Time: 03/04/22  9:25 PM   Result Value Ref Range    QT Interval 406 ms    QTC Interval 431 ms   Troponin    Collection Time: 03/04/22  9:29 PM    Specimen: Blood   Result Value Ref Range    Troponin T <0.010 0.000 - 0.030 ng/mL   COVID-19 and FLU A/B PCR - Swab, Nasopharynx    Collection Time: 03/04/22 10:29 PM    Specimen: Nasopharynx; Swab   Result Value Ref Range    COVID19 Not Detected Not Detected - Ref. Range    Influenza A PCR Not Detected Not Detected    Influenza B PCR Not Detected Not Detected   Calcium, Ionized    Collection Time: 03/04/22 11:33 PM    Specimen: Blood   Result Value Ref Range    Ionized Calcium 1.42 (H) 1.12 - 1.32 mmol/L     Note: In addition to lab results from this visit, the labs listed above may include labs taken at another facility or during a different encounter within the last 24 hours. Please correlate lab times with ED admission and discharge times for further clarification of the services performed during this visit.    CT Angiogram Chest   Final Result   No evidence of pulmonary embolus, pneumonia or other active chest   disease.       This report was finalized on 3/4/2022 9:56 PM by Dr. Huang Roque MD.          CT Head Without Contrast   Final Result   No evidence of acute intracranial disease.       This report was finalized on 3/4/2022 9:45 PM by Dr. Huang Roque MD.          XR Chest 1 View   Final Result   No  "evidence of active chest disease.       This report was finalized on 3/4/2022 8:56 PM by Dr. Huang Roque MD.            Vitals:    03/04/22 1914 03/04/22 2134 03/04/22 2229 03/04/22 2332   BP: 137/73 126/77 104/67 132/92   BP Location: Left arm Right arm Right arm Right arm   Patient Position: Sitting Sitting Sitting Sitting   Pulse: 80 66 66 64   Resp: 16 16 16 16   Temp: 97.7 °F (36.5 °C)      TempSrc: Oral      SpO2: 99% 98% 98% 99%   Weight: 113 kg (250 lb)      Height: 170.2 cm (67\")        Medications   sodium chloride 0.9 % flush 10 mL (has no administration in time range)   dextrose (GLUTOSE) oral gel 15 g (has no administration in time range)   dextrose (D50W) (25 g/50 mL) IV injection 25 g (has no administration in time range)   glucagon (human recombinant) (GLUCAGEN DIAGNOSTIC) injection 1 mg (has no administration in time range)   insulin lispro (humaLOG) injection 0-7 Units (has no administration in time range)   sodium chloride 0.9 % bolus 1,000 mL (has no administration in time range)   aspirin chewable tablet 324 mg (243 mg Oral Given 3/4/22 2012)   iopamidol (ISOVUE-370) 76 % injection 100 mL (69 mL Intravenous Given 3/4/22 2113)     ECG/EMG Results (last 24 hours)     Procedure Component Value Units Date/Time    ECG 12 Lead [[id number]] Collected: 03/04/22 1923     Updated: 03/04/22 1926        ECG 12 Lead   Final Result   Test Reason : chest pain   Blood Pressure :   */*   mmHG   Vent. Rate :  68 BPM     Atrial Rate :  68 BPM      P-R Int : 204 ms          QRS Dur :  88 ms       QT Int : 406 ms       P-R-T Axes :  63  53  67 degrees      QTc Int : 431 ms      Normal sinus rhythm   Cannot rule out Anterior infarct , age undetermined   Abnormal ECG   When compared with ECG of 04-MAR-2022 19:23, (Unconfirmed)   Sinus rhythm has replaced Junctional rhythm   Confirmed by SERA PURVIS MD ([id number]) on 3/5/2022 12:43:31 AM      Referred By: TRAVON           Confirmed By: SERA PURVIS MD      ECG 12 Lead "   Final Result   Test Reason : chest pain   Blood Pressure :   */*   mmHG   Vent. Rate :  74 BPM     Atrial Rate :   * BPM      P-R Int :   * ms          QRS Dur :  88 ms       QT Int : 372 ms       P-R-T Axes :   *  57  63 degrees      QTc Int : 412 ms      Accelerated Junctional rhythm   Abnormal ECG   No previous ECGs available   Confirmed by SERA PURVIS MD ([id number]) on 3/5/2022 12:43:55 AM      Referred By:            Confirmed By: SERA PURVIS MD                                              HEART Score (for prediction of 6-week risk of major adverse cardiac event) reviewed and/or performed as part of the patient evaluation and treatment planning process.  The result associated with this review/performance is: 3       MDM    Final diagnoses:   Atypical chest pain   Shortness of breath   Near syncope   Numbness and tingling of left upper and lower extremity   History of hypertension   History of hyperlipidemia   History of obesity   History of diabetes mellitus   Family history of coronary artery disease       ED Disposition  ED Disposition     ED Disposition   Decision to Admit    Condition   --    Comment   Level of Care: Telemetry [5]   Diagnosis: Chest pain [[id number]]   Admitting Physician: MEE PRECIADO [[id number]]   Attending Physician: MEE PRECIADO [[id number]]   Bed Request Comments: cdu               No follow-up provider specified.       Medication List      No changes were made to your prescriptions during this visit.          Pamela Betts PA-C  03/05/22 0346       Pamela Betts PA-C  03/05/22 0456

## 2022-03-05 NOTE — PLAN OF CARE
Goal Outcome Evaluation:  Plan of Care Reviewed With: patient, spouse        Progress: no change  Outcome Evaluation: VSS; NSR  with depressed ST segment; A&O LS clear continue on RA denies chest pain or N/V;  remains at bedside, call light within reach; will continue to monitor

## 2022-03-05 NOTE — H&P
"    [facility]  Clinical Decision Unit (CDU)  History and Physical    Patient Name: Nighat Oden  : 1965  MRN: [id number]  Primary Care Physician: Fausto Powers MD  Date of admission: 3/4/2022  7:55 PM      Subjective   Subjective     Chief Complaint:  Chest Pain    HPI:  Nighat Oden is a 56 y.o. female with a history of type 2 diabetes, hypertension, hyperlipidemia, anxiety who presents to [facility] ED with complaint of intermittent chest pain.  She states been going on for about 3 weeks now.  States her symptoms began at random times, sometimes even at rest.  Describes the pain as a sharp stabbing pain in the left side of her chest, radiating into her left ribs, left shoulder, and into her back.  She states her symptoms lasts about 5-10 minutes before spontaneously resolving.  Does endorse some dizziness with the symptoms as well.  She also sometimes states that when she gets these episodes, she feels as though \"there has been cold water poured inside her\".  She states she was seen by outpatient cardiologist in [facility].  She reports wearing Holter monitor and had recently turned this in.  She states she is scheduled for a follow-up visit to review these results early next week.  She also states she underwent a stress test within the last few weeks but has not heard back with these results.  She denies fever, chills, shortness of breath, cough, abdominal pain, nausea, vomiting, or diarrhea.  Patient is currently chest pain-free at this time.  She is a former smoker.  Denies alcohol use or illicit drug use.    COVID Details:    Symptoms:    [x] NONE [] Fever []  Cough [] Shortness of breath [] Change in taste/smell      Review of Systems   Constitutional: Negative for activity change, appetite change, chills and fever.   HENT: Negative for nosebleeds, postnasal drip and trouble swallowing.    Eyes: Negative for photophobia and visual " disturbance.   Respiratory: Negative for cough, shortness of breath and wheezing.    Cardiovascular: Positive for chest pain and palpitations. Negative for leg swelling.   Gastrointestinal: Negative for abdominal pain, diarrhea, nausea and vomiting.   Genitourinary: Negative for dysuria and hematuria.   Musculoskeletal: Negative for arthralgias and myalgias.   Neurological: Positive for dizziness. Negative for tremors, syncope, speech difficulty and weakness.   Psychiatric/Behavioral: Negative for confusion. The patient is nervous/anxious.       All other systems reviewed and negative    Personal History     Past Medical History:   Diagnosis Date   • Disc disorder of thoracic region     compressed disc T10-T11   • Family history of breast cancer in mother    • Menopause    • Obesity    • Osteoarthritis    • Sleep apnea with use of continuous positive airway pressure (CPAP)        Past Surgical History:   Procedure Laterality Date   • BREAST BIOPSY     • CERVICAL DISCECTOMY ANTERIOR     • CHOLECYSTECTOMY     • SALPINGO OOPHORECTOMY Left    • TOTAL ABDOMINAL HYSTERECTOMY WITH SALPINGO OOPHORECTOMY Right        Family History:  family history includes Breast cancer in her mother; Cancer in her father; Diabetes in her father and mother; Pulmonary embolism in her father. Otherwise pertinent FHx was reviewed and unremarkable.     Social History:  reports that she has quit smoking. She has never used smokeless tobacco. She reports that she does not drink alcohol and does not use drugs.  Social History     Social History Narrative   • Not on file       Medications:  Easy Touch Lancets 30G/Twist, HYDROcodone-acetaminophen, ONE TOUCH ULTRA MINI, cyclobenzaprine, glimepiride, glucose blood, lisinopril, metFORMIN, and simvastatin    No Known Allergies    Objective   Objective     Vital Signs:   Temp:  [97.7 °F (36.5 °C)] 97.7 °F (36.5 °C)  Heart Rate:  [64-80] 64  Resp:  [16] 16  BP: (104-137)/(67-92) 132/92    Physical  Exam  Constitutional:       General: She is not in acute distress.     Appearance: Normal appearance. She is obese. She is not ill-appearing.   HENT:      Head: Atraumatic.      Right Ear: External ear normal.      Left Ear: External ear normal.      Nose: Nose normal.   Eyes:      Extraocular Movements: Extraocular movements intact.      Conjunctiva/sclera: Conjunctivae normal.      Pupils: Pupils are equal, round, and reactive to light.   Cardiovascular:      Rate and Rhythm: Normal rate and regular rhythm.      Pulses: Normal pulses.      Heart sounds: Normal heart sounds. No murmur heard.      Pulmonary:      Effort: Pulmonary effort is normal. No respiratory distress.      Breath sounds: Normal breath sounds. No wheezing, rhonchi or rales.   Abdominal:      General: Bowel sounds are normal. There is no distension.      Tenderness: There is no abdominal tenderness. There is no guarding or rebound.   Musculoskeletal:         General: Normal range of motion.      Cervical back: No rigidity.      Right lower leg: No edema.      Left lower leg: No edema.   Skin:     General: Skin is warm and dry.      Coloration: Skin is not jaundiced.      Findings: No lesion or rash.   Neurological:      General: No focal deficit present.      Mental Status: She is alert and oriented to person, place, and time.   Psychiatric:         Attention and Perception: Attention normal.         Mood and Affect: Mood normal.         Behavior: Behavior normal.         Thought Content: Thought content normal.          Results Reviewed:  I have personally reviewed most recent cardiac tracings, lab results and radiology images and interpretations and agree with findings.    LAB RESULTS:      Lab 03/04/22 1928   WBC 9.40   HEMOGLOBIN 14.6   HEMATOCRIT 44.1   PLATELETS 233   NEUTROS ABS 5.38   IMMATURE GRANS (ABS) 0.02   LYMPHS ABS 3.26*   MONOS ABS 0.55   EOS ABS 0.15   MCV 86.5         Lab 03/04/22 2333 03/04/22 1928   SODIUM  --  142    POTASSIUM  --  4.6   CHLORIDE  --  106   CO2  --  25.0   ANION GAP  --  11.0   BUN  --  10   CREATININE  --  0.79   EGFR  --  88.5   GLUCOSE  --  116*   CALCIUM  --  10.6*   IONIZED CALCIUM 1.42*  --    MAGNESIUM  --  2.2   HEMOGLOBIN A1C  --  8.80*   TSH  --  1.640         Lab 03/04/22 1928   TOTAL PROTEIN 7.3   ALBUMIN 4.90   GLOBULIN 2.4   ALT (SGPT) 47*   AST (SGOT) 33*   BILIRUBIN <0.2   ALK PHOS 100   LIPASE 24         Lab 03/04/22 2129 03/04/22 1928   PROBNP  --  8.9   TROPONIN T <0.010 <0.010                 Brief Urine Lab Results     None        Microbiology Results (last 10 days)     Procedure Component Value - Date/Time    COVID PRE-OP / PRE-PROCEDURE SCREENING ORDER (NO ISOLATION) - Swab, Nasopharynx [[id number]]  (Normal) Collected: 03/04/22 2229    Lab Status: Final result Specimen: Swab from Nasopharynx Updated: 03/04/22 2259    Narrative:      The following orders were created for panel order COVID PRE-OP / PRE-PROCEDURE SCREENING ORDER (NO ISOLATION) - Swab, Nasopharynx.  Procedure                               Abnormality         Status                     ---------                               -----------         ------                     COVID-19 and FLU A/B PCR...[[id number]]  Normal              Final result                 Please view results for these tests on the individual orders.    COVID-19 and FLU A/B PCR - Swab, Nasopharynx [[id number]]  (Normal) Collected: 03/04/22 2229    Lab Status: Final result Specimen: Swab from Nasopharynx Updated: 03/04/22 2259     COVID19 Not Detected     Influenza A PCR Not Detected     Influenza B PCR Not Detected    Narrative:      Fact sheet for providers: https://www.fda.gov/media/521771/download    Fact sheet for patients: https://www.fda.gov/media/966765/download    Test performed by PCR.                 Assessment/Plan   Assessment / Plan     Active Hospital Problems    Diagnosis  POA   • Chest pain [R07.9]  Yes     Priority: High   • Elevated  liver enzymes [R74.8]  Yes     Priority: Medium   • Type 2 diabetes mellitus (HCC) [E11.9]  Yes     Priority: Medium   • Essential hypertension [I10]  Yes     Priority: Medium   • Hyperlipidemia [E78.5]  Yes     Priority: Medium   • Hypercalcemia [E83.52]  Yes     Priority: Low       Plan:    Nighat Oden is a 56 y.o. female with a history of type 2 diabetes, hypertension, hyperlipidemia, anxiety who presents to [facility] ED with complaint of intermittent chest pain for 3 weeks. Is currently being worked up by, Dr. Roland Nelson at [facility] and just turned in Holter monitor. Had a chemical stress test week before last but hasn't heard back on that. Currently chest pain free.    Chest Pain  -Patient currently chest pain-free.  VSS.  On room air.   -CXR unremarkable.  -CTA chest unremarkable  -Troponin negative x2.  -EKG shows no ischemic ST changes.  -Mag normal  -We will obtain an echo in the am.  -Cardiology consult in the a.m.  - given in the ED  -N.p.o. after midnight  -Monitor on telemetry  -We will get a carotid duplex in the a.m. since patient is saying she is having episodes of dizziness with some possible intermittent paresthesia with these episodes.   -CT head without contrast shows no acute intracranial disease.    Hypertension  Hyperlipidemia  -Continue home lisinopril  -Continue statin  -Lipid panel in the a.m.    Type 2 diabetes mellitus  -Blood glucose 116 on arrival  -Check A1C  -Hold home oral meds  -Fingerstick ACHS. SSI    Elevated liver enzymes  -Hepatitis panel pending.  -May consider RUQ ultrasound.    Hypercalcemia  -Check ionized calcium  -Check Vit D25 hydroxy, and D 1,25 Dihydroxy  - Repeat bmp in the am          CODE STATUS:  Full Code  Code Status (Patient has no pulse and is not breathing): CPR (Attempt to Resuscitate)  Medical Interventions (Patient has pulse or is breathing): Full Support            Discharge Blueprint (criteria for discharge):   Patient is chest  pain free on home medications  Patient evaluated by cardiology and cleared for discharge    Dianelys Macias PA-C  03/05/22

## 2022-03-06 ENCOUNTER — APPOINTMENT (OUTPATIENT)
Dept: MRI IMAGING | Facility: HOSPITAL | Age: 57
End: 2022-03-06

## 2022-03-06 PROBLEM — R07.89 ATYPICAL CHEST PAIN: Status: ACTIVE | Noted: 2022-03-06

## 2022-03-06 LAB
ALBUMIN SERPL-MCNC: 4.3 G/DL (ref 3.5–5.2)
ALBUMIN/GLOB SERPL: 1.9 G/DL
ALP SERPL-CCNC: 95 U/L (ref 39–117)
ALT SERPL W P-5'-P-CCNC: 36 U/L (ref 1–33)
ANION GAP SERPL CALCULATED.3IONS-SCNC: 14 MMOL/L (ref 5–15)
AST SERPL-CCNC: 23 U/L (ref 1–32)
BASOPHILS # BLD AUTO: 0.05 10*3/MM3 (ref 0–0.2)
BASOPHILS NFR BLD AUTO: 0.6 % (ref 0–1.5)
BILIRUB SERPL-MCNC: 0.4 MG/DL (ref 0–1.2)
BUN SERPL-MCNC: 12 MG/DL (ref 6–20)
BUN/CREAT SERPL: 16.9 (ref 7–25)
CALCIUM SPEC-SCNC: 10.1 MG/DL (ref 8.6–10.5)
CHLORIDE SERPL-SCNC: 104 MMOL/L (ref 98–107)
CO2 SERPL-SCNC: 21 MMOL/L (ref 22–29)
CREAT SERPL-MCNC: 0.71 MG/DL (ref 0.57–1)
DEPRECATED RDW RBC AUTO: 39.5 FL (ref 37–54)
EGFRCR SERPLBLD CKD-EPI 2021: 100.6 ML/MIN/1.73
EOSINOPHIL # BLD AUTO: 0.16 10*3/MM3 (ref 0–0.4)
EOSINOPHIL NFR BLD AUTO: 1.9 % (ref 0.3–6.2)
ERYTHROCYTE [DISTWIDTH] IN BLOOD BY AUTOMATED COUNT: 12.6 % (ref 12.3–15.4)
GLOBULIN UR ELPH-MCNC: 2.3 GM/DL
GLUCOSE BLDC GLUCOMTR-MCNC: 113 MG/DL (ref 70–130)
GLUCOSE BLDC GLUCOMTR-MCNC: 120 MG/DL (ref 70–130)
GLUCOSE BLDC GLUCOMTR-MCNC: 141 MG/DL (ref 70–130)
GLUCOSE SERPL-MCNC: 116 MG/DL (ref 65–99)
HAV IGM SERPL QL IA: NORMAL
HBV CORE IGM SERPL QL IA: NORMAL
HBV SURFACE AG SERPL QL IA: NORMAL
HCT VFR BLD AUTO: 41.2 % (ref 34–46.6)
HCV AB SER DONR QL: NORMAL
HGB BLD-MCNC: 13.8 G/DL (ref 12–15.9)
IMM GRANULOCYTES # BLD AUTO: 0.02 10*3/MM3 (ref 0–0.05)
IMM GRANULOCYTES NFR BLD AUTO: 0.2 % (ref 0–0.5)
LYMPHOCYTES # BLD AUTO: 3.57 10*3/MM3 (ref 0.7–3.1)
LYMPHOCYTES NFR BLD AUTO: 41.7 % (ref 19.6–45.3)
MCH RBC QN AUTO: 28.8 PG (ref 26.6–33)
MCHC RBC AUTO-ENTMCNC: 33.5 G/DL (ref 31.5–35.7)
MCV RBC AUTO: 86 FL (ref 79–97)
MONOCYTES # BLD AUTO: 0.71 10*3/MM3 (ref 0.1–0.9)
MONOCYTES NFR BLD AUTO: 8.3 % (ref 5–12)
NEUTROPHILS NFR BLD AUTO: 4.06 10*3/MM3 (ref 1.7–7)
NEUTROPHILS NFR BLD AUTO: 47.3 % (ref 42.7–76)
NRBC BLD AUTO-RTO: 0 /100 WBC (ref 0–0.2)
PLATELET # BLD AUTO: 197 10*3/MM3 (ref 140–450)
PMV BLD AUTO: 9 FL (ref 6–12)
POTASSIUM SERPL-SCNC: 3.8 MMOL/L (ref 3.5–5.2)
PROT SERPL-MCNC: 6.6 G/DL (ref 6–8.5)
RBC # BLD AUTO: 4.79 10*6/MM3 (ref 3.77–5.28)
SODIUM SERPL-SCNC: 139 MMOL/L (ref 136–145)
WBC NRBC COR # BLD: 8.57 10*3/MM3 (ref 3.4–10.8)

## 2022-03-06 PROCEDURE — 80074 ACUTE HEPATITIS PANEL: CPT | Performed by: HOSPITALIST

## 2022-03-06 PROCEDURE — 25010000002 ENOXAPARIN PER 10 MG: Performed by: PHYSICIAN ASSISTANT

## 2022-03-06 PROCEDURE — A9577 INJ MULTIHANCE: HCPCS | Performed by: HOSPITALIST

## 2022-03-06 PROCEDURE — 25010000002 LORAZEPAM PER 2 MG: Performed by: HOSPITALIST

## 2022-03-06 PROCEDURE — 99232 SBSQ HOSP IP/OBS MODERATE 35: CPT | Performed by: HOSPITALIST

## 2022-03-06 PROCEDURE — 85025 COMPLETE CBC W/AUTO DIFF WBC: CPT | Performed by: PHYSICIAN ASSISTANT

## 2022-03-06 PROCEDURE — 80053 COMPREHEN METABOLIC PANEL: CPT | Performed by: HOSPITALIST

## 2022-03-06 PROCEDURE — 70553 MRI BRAIN STEM W/O & W/DYE: CPT

## 2022-03-06 PROCEDURE — 0 GADOBENATE DIMEGLUMINE 529 MG/ML SOLUTION: Performed by: HOSPITALIST

## 2022-03-06 PROCEDURE — 99232 SBSQ HOSP IP/OBS MODERATE 35: CPT | Performed by: INTERNAL MEDICINE

## 2022-03-06 PROCEDURE — 82962 GLUCOSE BLOOD TEST: CPT

## 2022-03-06 RX ORDER — LORAZEPAM 2 MG/ML
1 INJECTION INTRAMUSCULAR ONCE
Status: COMPLETED | OUTPATIENT
Start: 2022-03-06 | End: 2022-03-06

## 2022-03-06 RX ADMIN — CYCLOBENZAPRINE 5 MG: 10 TABLET, FILM COATED ORAL at 20:39

## 2022-03-06 RX ADMIN — LISINOPRIL 5 MG: 5 TABLET ORAL at 09:33

## 2022-03-06 RX ADMIN — Medication 10 ML: at 20:41

## 2022-03-06 RX ADMIN — Medication 5 MG: at 20:39

## 2022-03-06 RX ADMIN — GADOBENATE DIMEGLUMINE 20 ML: 529 INJECTION, SOLUTION INTRAVENOUS at 12:00

## 2022-03-06 RX ADMIN — ENOXAPARIN SODIUM 40 MG: 40 INJECTION SUBCUTANEOUS at 09:33

## 2022-03-06 RX ADMIN — LORAZEPAM 1 MG: 2 INJECTION INTRAMUSCULAR; INTRAVENOUS at 11:00

## 2022-03-06 RX ADMIN — ATORVASTATIN CALCIUM 10 MG: 10 TABLET, FILM COATED ORAL at 09:33

## 2022-03-06 NOTE — PAYOR COMM NOTE
"Id # 03/06/22 [id number]  Nohemi Murdock RN, BSN, CMGT-BC  Phone # [phone]  Fax # [phone]  Inpt order placed on 3/6/22,   03/06/22 1022  Inpatient Admission  Once     Completed     Level of Care: Telemetry    Diagnosis: Atypical chest pain [285184]    Admitting Physician: SERENA MORTON [[id number]]    Attending Physician: SERENA MORTON [[id number]]    Certification: I Certify That Inpatient Hospital Services Are Medically Necessary For Greater Than 2 Midnights        03/06/22 1021       Robel Bauer (56 y.o. Female)             Date of Birth   1965    Social Security Number       Address   [address] [place] [address]    Home Phone   [phone]    MRN   [id number]       Restorationism   Buddhist    Marital Status                               Admission Date       Admission Type   Emergency    Admitting Provider   Serena Morton DO    Attending Provider   Serena Morton DO    Department, Room/Bed   [address], S555/1       Discharge Date       Discharge Disposition       Discharge Destination                               Attending Provider: Serena Morton DO    Allergies: No Known Allergies    Isolation: None   Infection: None   Code Status: CPR   Advance Care Planning Activity    Ht: 170.2 cm (67\")   Wt: 115 kg (254 lb 8 oz)    Admission Cmt: None   Principal Problem: None                Active Insurance as of 3/4/2022     Primary Coverage     Payor Plan Insurance Group Employer/Plan Group    [facility]ICARE [facility]PLAC[facility]      Payor Plan Address Payor Plan Phone Number Payor Plan Fax Number Effective Dates     [address] [address] [phone]  3/1/2022 - None Entered    [facility] [id number]       Subscriber Name Subscriber Birth Date Member ID       ROBEL BAUER 1965 [id number]                    History & Physical      Dianelys Macias PA-C at 03/04/22 5517     Attestation signed by Prem Valladares MD " "at 22    Patient seen and billed independently as CDU/observation status by APC.                      [facility] Medicine Services  Clinical Decision Unit (CDU)  History and Physical    Patient Name: Nighat Oden  : 1965  MRN: [id number]  Primary Care Physician: Fausto Powers MD  Date of admission: 3/4/2022  7:55 PM      Subjective   Subjective     Chief Complaint:  Chest Pain    HPI:  Nighat Oden is a 56 y.o. female with a history of type 2 diabetes, hypertension, hyperlipidemia, anxiety who presents to [facility] ED with complaint of intermittent chest pain.  She states been going on for about 3 weeks now.  States her symptoms began at random times, sometimes even at rest.  Describes the pain as a sharp stabbing pain in the left side of her chest, radiating into her left ribs, left shoulder, and into her back.  She states her symptoms lasts about 5-10 minutes before spontaneously resolving.  Does endorse some dizziness with the symptoms as well.  She also sometimes states that when she gets these episodes, she feels as though \"there has been cold water poured inside her\".  She states she was seen by outpatient cardiologist in [facility].  She reports wearing Holter monitor and had recently turned this in.  She states she is scheduled for a follow-up visit to review these results early next week.  She also states she underwent a stress test within the last few weeks but has not heard back with these results.  She denies fever, chills, shortness of breath, cough, abdominal pain, nausea, vomiting, or diarrhea.  Patient is currently chest pain-free at this time.  She is a former smoker.  Denies alcohol use or illicit drug use.    COVID Details:    Symptoms:    [x] NONE [] Fever []  Cough [] Shortness of breath [] Change in taste/smell      Review of Systems   Constitutional: Negative for activity change, appetite change, chills and fever.   HENT: " Negative for nosebleeds, postnasal drip and trouble swallowing.    Eyes: Negative for photophobia and visual disturbance.   Respiratory: Negative for cough, shortness of breath and wheezing.    Cardiovascular: Positive for chest pain and palpitations. Negative for leg swelling.   Gastrointestinal: Negative for abdominal pain, diarrhea, nausea and vomiting.   Genitourinary: Negative for dysuria and hematuria.   Musculoskeletal: Negative for arthralgias and myalgias.   Neurological: Positive for dizziness. Negative for tremors, syncope, speech difficulty and weakness.   Psychiatric/Behavioral: Negative for confusion. The patient is nervous/anxious.       All other systems reviewed and negative    Personal History     Past Medical History:   Diagnosis Date   • Disc disorder of thoracic region     compressed disc T10-T11   • Family history of breast cancer in mother    • Menopause    • Obesity    • Osteoarthritis    • Sleep apnea with use of continuous positive airway pressure (CPAP)        Past Surgical History:   Procedure Laterality Date   • BREAST BIOPSY     • CERVICAL DISCECTOMY ANTERIOR     • CHOLECYSTECTOMY     • SALPINGO OOPHORECTOMY Left    • TOTAL ABDOMINAL HYSTERECTOMY WITH SALPINGO OOPHORECTOMY Right        Family History:  family history includes Breast cancer in her mother; Cancer in her father; Diabetes in her father and mother; Pulmonary embolism in her father. Otherwise pertinent FHx was reviewed and unremarkable.     Social History:  reports that she has quit smoking. She has never used smokeless tobacco. She reports that she does not drink alcohol and does not use drugs.  Social History     Social History Narrative   • Not on file       Medications:  Easy Touch Lancets 30G/Twist, HYDROcodone-acetaminophen, ONE TOUCH ULTRA MINI, cyclobenzaprine, glimepiride, glucose blood, lisinopril, metFORMIN, and simvastatin    No Known Allergies    Objective   Objective     Vital Signs:   Temp:  [97.7 °F (36.5 °C)]  97.7 °F (36.5 °C)  Heart Rate:  [64-80] 64  Resp:  [16] 16  BP: (104-137)/(67-92) 132/92    Physical Exam  Constitutional:       General: She is not in acute distress.     Appearance: Normal appearance. She is obese. She is not ill-appearing.   HENT:      Head: Atraumatic.      Right Ear: External ear normal.      Left Ear: External ear normal.      Nose: Nose normal.   Eyes:      Extraocular Movements: Extraocular movements intact.      Conjunctiva/sclera: Conjunctivae normal.      Pupils: Pupils are equal, round, and reactive to light.   Cardiovascular:      Rate and Rhythm: Normal rate and regular rhythm.      Pulses: Normal pulses.      Heart sounds: Normal heart sounds. No murmur heard.      Pulmonary:      Effort: Pulmonary effort is normal. No respiratory distress.      Breath sounds: Normal breath sounds. No wheezing, rhonchi or rales.   Abdominal:      General: Bowel sounds are normal. There is no distension.      Tenderness: There is no abdominal tenderness. There is no guarding or rebound.   Musculoskeletal:         General: Normal range of motion.      Cervical back: No rigidity.      Right lower leg: No edema.      Left lower leg: No edema.   Skin:     General: Skin is warm and dry.      Coloration: Skin is not jaundiced.      Findings: No lesion or rash.   Neurological:      General: No focal deficit present.      Mental Status: She is alert and oriented to person, place, and time.   Psychiatric:         Attention and Perception: Attention normal.         Mood and Affect: Mood normal.         Behavior: Behavior normal.         Thought Content: Thought content normal.          Results Reviewed:  I have personally reviewed most recent cardiac tracings, lab results and radiology images and interpretations and agree with findings.    LAB RESULTS:      Lab 03/04/22 1928   WBC 9.40   HEMOGLOBIN 14.6   HEMATOCRIT 44.1   PLATELETS 233   NEUTROS ABS 5.38   IMMATURE GRANS (ABS) 0.02   LYMPHS ABS 3.26*   MONOS  ABS 0.55   EOS ABS 0.15   MCV 86.5         Lab 03/04/22  2333 03/04/22 1928   SODIUM  --  142   POTASSIUM  --  4.6   CHLORIDE  --  106   CO2  --  25.0   ANION GAP  --  11.0   BUN  --  10   CREATININE  --  0.79   EGFR  --  88.5   GLUCOSE  --  116*   CALCIUM  --  10.6*   IONIZED CALCIUM 1.42*  --    MAGNESIUM  --  2.2   HEMOGLOBIN A1C  --  8.80*   TSH  --  1.640         Lab 03/04/22 1928   TOTAL PROTEIN 7.3   ALBUMIN 4.90   GLOBULIN 2.4   ALT (SGPT) 47*   AST (SGOT) 33*   BILIRUBIN <0.2   ALK PHOS 100   LIPASE 24         Lab 03/04/22 2129 03/04/22 1928   PROBNP  --  8.9   TROPONIN T <0.010 <0.010                 Brief Urine Lab Results     None        Microbiology Results (last 10 days)     Procedure Component Value - Date/Time    COVID PRE-OP / PRE-PROCEDURE SCREENING ORDER (NO ISOLATION) - Swab, Nasopharynx [[id number]]  (Normal) Collected: 03/04/22 2229    Lab Status: Final result Specimen: Swab from Nasopharynx Updated: 03/04/22 2259    Narrative:      The following orders were created for panel order COVID PRE-OP / PRE-PROCEDURE SCREENING ORDER (NO ISOLATION) - Swab, Nasopharynx.  Procedure                               Abnormality         Status                     ---------                               -----------         ------                     COVID-19 and FLU A/B PCR...[[id number]]  Normal              Final result                 Please view results for these tests on the individual orders.    COVID-19 and FLU A/B PCR - Swab, Nasopharynx [[id number]]  (Normal) Collected: 03/04/22 2229    Lab Status: Final result Specimen: Swab from Nasopharynx Updated: 03/04/22 2259     COVID19 Not Detected     Influenza A PCR Not Detected     Influenza B PCR Not Detected    Narrative:      Fact sheet for providers: https://www.fda.gov/media/107004/download    Fact sheet for patients: https://www.fda.gov/media/985595/download    Test performed by PCR.                 Assessment/Plan   Assessment / Plan     Active  Hospital Problems    Diagnosis  POA   • Chest pain [R07.9]  Yes     Priority: High   • Elevated liver enzymes [R74.8]  Yes     Priority: Medium   • Type 2 diabetes mellitus (HCC) [E11.9]  Yes     Priority: Medium   • Essential hypertension [I10]  Yes     Priority: Medium   • Hyperlipidemia [E78.5]  Yes     Priority: Medium   • Hypercalcemia [E83.52]  Yes     Priority: Low       Plan:    Nighat Oden is a 56 y.o. female with a history of type 2 diabetes, hypertension, hyperlipidemia, anxiety who presents to [facility] ED with complaint of intermittent chest pain for 3 weeks. Is currently being worked up by, Dr. Roland Nelson at [facility] and just turned in Holter monitor. Had a chemical stress test week before last but hasn't heard back on that. Currently chest pain free.    Chest Pain  -Patient currently chest pain-free.  VSS.  On room air.   -CXR unremarkable.  -CTA chest unremarkable  -Troponin negative x2.  -EKG shows no ischemic ST changes.  -Mag normal  -We will obtain an echo in the am.  -Cardiology consult in the a.m.  - given in the ED  -N.p.o. after midnight  -Monitor on telemetry  -We will get a carotid duplex in the a.m. since patient is saying she is having episodes of dizziness with some possible intermittent paresthesia with these episodes.   -CT head without contrast shows no acute intracranial disease.    Hypertension  Hyperlipidemia  -Continue home lisinopril  -Continue statin  -Lipid panel in the a.m.    Type 2 diabetes mellitus  -Blood glucose 116 on arrival  -Check A1C  -Hold home oral meds  -Fingerstick ACHS. SSI    Elevated liver enzymes  -Hepatitis panel pending.  -May consider RUQ ultrasound.    Hypercalcemia  -Check ionized calcium  -Check Vit D25 hydroxy, and D 1,25 Dihydroxy  - Repeat bmp in the am          CODE STATUS:  Full Code  Code Status (Patient has no pulse and is not breathing): CPR (Attempt to Resuscitate)  Medical Interventions (Patient has pulse or is  breathing): Full Support            Discharge Blueprint (criteria for discharge):   1. Patient is chest pain free on home medications  2. Patient evaluated by cardiology and cleared for discharge    Dianelys Macias PA-C  03/05/22      Electronically signed by Prem Valladares MD at 03/05/22 1931         Current Facility-Administered Medications   Medication Dose Route Frequency Provider Last Rate Last Admin   • acetaminophen (TYLENOL) tablet 650 mg  650 mg Oral Q6H PRN Ligia Pablo APRN   650 mg at 03/05/22 2127   • atorvastatin (LIPITOR) tablet 10 mg  10 mg Oral Daily Dianelys Macias PA-C   10 mg at 03/06/22 0933   • cyclobenzaprine (FLEXERIL) tablet 5 mg  5 mg Oral Nightly Dianelys Macias PA-C   5 mg at 03/05/22 1958   • dextrose (D50W) (25 g/50 mL) IV injection 25 g  25 g Intravenous Q15 Min PRN Dianelys Macias PA-C       • dextrose (GLUTOSE) oral gel 15 g  15 g Oral Q15 Min PRN Dianelys Macias PA-C       • enoxaparin (LOVENOX) syringe 40 mg  40 mg Subcutaneous Q24H Dianelys Macias PA-C   40 mg at 03/06/22 0933   • glucagon (human recombinant) (GLUCAGEN DIAGNOSTIC) injection 1 mg  1 mg Intramuscular Q15 Min PRN Dianelys Macias PA-C       • insulin lispro (humaLOG) injection 0-7 Units  0-7 Units Subcutaneous TID AC Dianelys Macias PA-C       • lisinopril (PRINIVIL,ZESTRIL) tablet 5 mg  5 mg Oral Daily Dianelys Macias PA-C   5 mg at 03/06/22 0933   • LORazepam (ATIVAN) injection 1 mg  1 mg Intravenous Once Neda Morton DO       • melatonin tablet 5 mg  5 mg Oral Nightly PRN Dianelys Macias PA-C   5 mg at 03/05/22 2127   • ondansetron (ZOFRAN) tablet 4 mg  4 mg Oral Q6H PRN Dianelys Macias PA-C        Or   • ondansetron (ZOFRAN) injection 4 mg  4 mg Intravenous Q6H PRN Dianelys Macias PA-C       • sodium chloride 0.9 % flush 10 mL  10 mL Intravenous PRN Tian Ruiz, DO   10 mL at 03/05/22 2126    • sodium chloride 0.9 % flush 10 mL  10 mL Intravenous Q12H Dianelys Macias PA-C       • sodium chloride 0.9 % flush 10 mL  10 mL Intravenous PRN Dianelys Macias PA-C         Lab Results (last 72 hours)     Procedure Component Value Units Date/Time    POC Glucose Once [[id number]]  (Normal) Collected: 03/06/22 0746    Specimen: Blood Updated: 03/06/22 0747     Glucose 120 mg/dL      Comment: Meter: [id number] : [id number] [facility]       Comprehensive Metabolic Panel [[id number]]  (Abnormal) Collected: 03/06/22 0350    Specimen: Blood Updated: 03/06/22 0640     Glucose 116 mg/dL      BUN 12 mg/dL      Creatinine 0.71 mg/dL      Sodium 139 mmol/L      Potassium 3.8 mmol/L      Chloride 104 mmol/L      CO2 21.0 mmol/L      Calcium 10.1 mg/dL      Total Protein 6.6 g/dL      Albumin 4.30 g/dL      ALT (SGPT) 36 U/L      AST (SGOT) 23 U/L      Alkaline Phosphatase 95 U/L      Total Bilirubin 0.4 mg/dL      Globulin 2.3 gm/dL      Comment: Calculated Result        A/G Ratio 1.9 g/dL      BUN/Creatinine Ratio 16.9     Anion Gap 14.0 mmol/L      eGFR 100.6 mL/min/1.73      Comment: National Kidney Foundation and American Society of Nephrology (ASN) Task Force recommended calculation based on the Chronic Kidney Disease Epidemiology Collaboration (CKD-EPI) equation refit without adjustment for race.       Narrative:      GFR Normal >60  Chronic Kidney Disease <60  Kidney Failure <15      CBC & Differential [[id number]]  (Abnormal) Collected: 03/06/22 0350    Specimen: Blood Updated: 03/06/22 0630    Narrative:      The following orders were created for panel order CBC & Differential.  Procedure                               Abnormality         Status                     ---------                               -----------         ------                     CBC Auto Differential[[id number]]        Abnormal            Final result                 Please view results for these tests on the individual  orders.    CBC Auto Differential [[id number]]  (Abnormal) Collected: 03/06/22 0350    Specimen: Blood Updated: 03/06/22 0630     WBC 8.57 10*3/mm3      RBC 4.79 10*6/mm3      Hemoglobin 13.8 g/dL      Hematocrit 41.2 %      MCV 86.0 fL      MCH 28.8 pg      MCHC 33.5 g/dL      RDW 12.6 %      RDW-SD 39.5 fl      MPV 9.0 fL      Platelets 197 10*3/mm3      Neutrophil % 47.3 %      Lymphocyte % 41.7 %      Monocyte % 8.3 %      Eosinophil % 1.9 %      Basophil % 0.6 %      Immature Grans % 0.2 %      Neutrophils, Absolute 4.06 10*3/mm3      Lymphocytes, Absolute 3.57 10*3/mm3      Monocytes, Absolute 0.71 10*3/mm3      Eosinophils, Absolute 0.16 10*3/mm3      Basophils, Absolute 0.05 10*3/mm3      Immature Grans, Absolute 0.02 10*3/mm3      nRBC 0.0 /100 WBC     Hepatitis Panel, Acute [[id number]] Collected: 03/06/22 0350    Specimen: Blood Updated: 03/06/22 0547    POC Glucose Once [[id number]]  (Abnormal) Collected: 03/05/22 2015    Specimen: Blood Updated: 03/05/22 2022     Glucose 151 mg/dL      Comment: Meter: [id number] : [id number] Phuc Carrion       POC Glucose Once [[id number]]  (Normal) Collected: 03/05/22 1634    Specimen: Blood Updated: 03/05/22 1636     Glucose 103 mg/dL      Comment: Meter: [id number] : [id number] Siri Arias       Basic Metabolic Panel [[id number]]  (Abnormal) Collected: 03/05/22 1109    Specimen: Blood Updated: 03/05/22 1248     Glucose 129 mg/dL      BUN 10 mg/dL      Creatinine 0.62 mg/dL      Sodium 138 mmol/L      Potassium 4.1 mmol/L      Chloride 107 mmol/L      CO2 19.0 mmol/L      Calcium 9.7 mg/dL      BUN/Creatinine Ratio 16.1     Anion Gap 12.0 mmol/L      eGFR 105.3 mL/min/1.73      Comment: National Kidney Foundation and American Society of Nephrology (ASN) Task Force recommended calculation based on the Chronic Kidney Disease Epidemiology Collaboration (CKD-EPI) equation refit without adjustment for race.       Narrative:      GFR Normal >60  Chronic  Kidney Disease <60  Kidney Failure <15      Lipid Panel [[id number]]  (Abnormal) Collected: 03/05/22 1109    Specimen: Blood Updated: 03/05/22 1248     Total Cholesterol 108 mg/dL      Triglycerides 128 mg/dL      HDL Cholesterol 35 mg/dL      LDL Cholesterol  50 mg/dL      VLDL Cholesterol 23 mg/dL      LDL/HDL Ratio 1.35    Narrative:      Cholesterol Reference Ranges  (U.S. Department of Health and Human Services ATP III Classifications)    Desirable          <200 mg/dL  Borderline High    200-239 mg/dL  High Risk          >240 mg/dL      Triglyceride Reference Ranges  (U.S. Department of Health and Human Services ATP III Classifications)    Normal           <150 mg/dL  Borderline High  150-199 mg/dL  High             200-499 mg/dL  Very High        >500 mg/dL    HDL Reference Ranges  (U.S. Department of Health and Human Services ATP III Classifications)    Low     <40 mg/dl (major risk factor for CHD)  High    >60 mg/dl ('negative' risk factor for CHD)        LDL Reference Ranges  (U.S. Department of Health and Human Services ATP III Classifications)    Optimal          <100 mg/dL  Near Optimal     100-129 mg/dL  Borderline High  130-159 mg/dL  High             160-189 mg/dL  Very High        >189 mg/dL    POC Glucose Once [[id number]]  (Normal) Collected: 03/05/22 1136    Specimen: Blood Updated: 03/05/22 1141     Glucose 121 mg/dL      Comment: Meter: [id number] : [id number] Moriah KEYES       CBC & Differential [[id number]]  (Abnormal) Collected: 03/05/22 1109    Specimen: Blood Updated: 03/05/22 1140    Narrative:      The following orders were created for panel order CBC & Differential.  Procedure                               Abnormality         Status                     ---------                               -----------         ------                     CBC Auto Differential[[id number]]        Abnormal            Final result                 Please view results for these tests on the individual  orders.    CBC Auto Differential [[id number]]  (Abnormal) Collected: 03/05/22 1109    Specimen: Blood Updated: 03/05/22 1140     WBC 7.75 10*3/mm3      RBC 4.63 10*6/mm3      Hemoglobin 13.4 g/dL      Hematocrit 43.6 %      MCV 94.2 fL      MCH 28.9 pg      MCHC 30.7 g/dL      RDW 12.5 %      RDW-SD 42.9 fl      MPV 8.8 fL      Platelets 176 10*3/mm3      Neutrophil % 52.8 %      Lymphocyte % 37.9 %      Monocyte % 7.5 %      Eosinophil % 1.3 %      Basophil % 0.4 %      Immature Grans % 0.1 %      Neutrophils, Absolute 4.09 10*3/mm3      Lymphocytes, Absolute 2.94 10*3/mm3      Monocytes, Absolute 0.58 10*3/mm3      Eosinophils, Absolute 0.10 10*3/mm3      Basophils, Absolute 0.03 10*3/mm3      Immature Grans, Absolute 0.01 10*3/mm3      nRBC 0.0 /100 WBC     POC Glucose Once [[id number]]  (Normal) Collected: 03/05/22 0725    Specimen: Blood Updated: 03/05/22 0726     Glucose 127 mg/dL      Comment: Meter: [id number] : [id number] [facility]       Calcium, Ionized [[id number]]  (Abnormal) Collected: 03/04/22 2333    Specimen: Blood Updated: 03/04/22 2353     Ionized Calcium 1.42 mmol/L     Hemoglobin A1c [[id number]]  (Abnormal) Collected: 03/04/22 1928    Specimen: Blood Updated: 03/04/22 2333     Hemoglobin A1C 8.80 %     Narrative:      Hemoglobin A1C Ranges:    Increased Risk for Diabetes  5.7% to 6.4%  Diabetes                     >= 6.5%  Diabetic Goal                < 7.0%    [place] Draw [[id number]] Collected: 03/04/22 1928    Specimen: Blood Updated: 03/04/22 2330    Narrative:      The following orders were created for panel order [place] Draw.  Procedure                               Abnormality         Status                     ---------                               -----------         ------                     Green Top (Gel)[[id number]]                                  Final result               Lavender Top[[id number]]                                     Final result               Gold Top -  SST[[id number]]                                   Final result               Pretty Top[[id number]]                                         Final result               Light Blue Top[[id number]]                                   Final result                 Please view results for these tests on the individual orders.    Gray Top [[id number]] Collected: 03/04/22 1928    Specimen: Blood Updated: 03/04/22 2330     Extra Tube Hold for add-ons.     Comment: Auto resulted.       COVID PRE-OP / PRE-PROCEDURE SCREENING ORDER (NO ISOLATION) - Swab, Nasopharynx [[id number]]  (Normal) Collected: 03/04/22 2229    Specimen: Swab from Nasopharynx Updated: 03/04/22 2259    Narrative:      The following orders were created for panel order COVID PRE-OP / PRE-PROCEDURE SCREENING ORDER (NO ISOLATION) - Swab, Nasopharynx.  Procedure                               Abnormality         Status                     ---------                               -----------         ------                     COVID-19 and FLU A/B PCR...[[id number]]  Normal              Final result                 Please view results for these tests on the individual orders.    COVID-19 and FLU A/B PCR - Swab, Nasopharynx [[id number]]  (Normal) Collected: 03/04/22 2229    Specimen: Swab from Nasopharynx Updated: 03/04/22 2259     COVID19 Not Detected     Influenza A PCR Not Detected     Influenza B PCR Not Detected    Narrative:      Fact sheet for providers: https://www.fda.gov/media/317298/download    Fact sheet for patients: https://www.fda.gov/media/172005/download    Test performed by PCR.    Troponin [[id number]]  (Normal) Collected: 03/04/22 2129    Specimen: Blood Updated: 03/04/22 2157     Troponin T <0.010 ng/mL     Narrative:      Troponin T Reference Range:  <= 0.03 ng/mL-   Negative for AMI  >0.03 ng/mL-     Abnormal for myocardial necrosis.  Clinicians would have to utilize clinical acumen, EKG, Troponin and serial changes to determine if it is an Acute Myocardial  Infarction or myocardial injury due to an underlying chronic condition.       Results may be falsely decreased if patient taking Biotin.      TSH [[id number]]  (Normal) Collected: 03/04/22 1928    Specimen: Blood Updated: 03/04/22 2114     TSH 1.640 uIU/mL     CK [[id number]]  (Normal) Collected: 03/04/22 1928    Specimen: Blood Updated: 03/04/22 2108     Creatine Kinase 70 U/L     Magnesium [[id number]]  (Normal) Collected: 03/04/22 1928    Specimen: Blood Updated: 03/04/22 2108     Magnesium 2.2 mg/dL     Light Blue Top [[id number]] Collected: 03/04/22 1928    Specimen: Blood Updated: 03/04/22 2030     Extra Tube hold for add-on     Comment: Auto resulted       Green Top (Gel) [[id number]] Collected: 03/04/22 1928    Specimen: Blood Updated: 03/04/22 2030     Extra Tube Hold for add-ons.     Comment: Auto resulted.       Lavender Top [[id number]] Collected: 03/04/22 1928    Specimen: Blood Updated: 03/04/22 2030     Extra Tube hold for add-on     Comment: Auto resulted       Gold Top - SST [[id number]] Collected: 03/04/22 1928    Specimen: Blood Updated: 03/04/22 2030     Extra Tube Hold for add-ons.     Comment: Auto resulted.       Comprehensive Metabolic Panel [[id number]]  (Abnormal) Collected: 03/04/22 1928    Specimen: Blood Updated: 03/04/22 2013     Glucose 116 mg/dL      BUN 10 mg/dL      Creatinine 0.79 mg/dL      Sodium 142 mmol/L      Potassium 4.6 mmol/L      Comment: Slight hemolysis detected by analyzer. Results may be affected.        Chloride 106 mmol/L      CO2 25.0 mmol/L      Calcium 10.6 mg/dL      Total Protein 7.3 g/dL      Albumin 4.90 g/dL      ALT (SGPT) 47 U/L      AST (SGOT) 33 U/L      Alkaline Phosphatase 100 U/L      Total Bilirubin <0.2 mg/dL      Globulin 2.4 gm/dL      Comment: Calculated Result        A/G Ratio 2.0 g/dL      BUN/Creatinine Ratio 12.7     Anion Gap 11.0 mmol/L      eGFR 88.5 mL/min/1.73      Comment: National Kidney Foundation and American Society of Nephrology (ASN) Task  Force recommended calculation based on the Chronic Kidney Disease Epidemiology Collaboration (CKD-EPI) equation refit without adjustment for race.       Narrative:      GFR Normal >60  Chronic Kidney Disease <60  Kidney Failure <15      Troponin [[id number]]  (Normal) Collected: 03/04/22 1928    Specimen: Blood Updated: 03/04/22 2011     Troponin T <0.010 ng/mL     Narrative:      Troponin T Reference Range:  <= 0.03 ng/mL-   Negative for AMI  >0.03 ng/mL-     Abnormal for myocardial necrosis.  Clinicians would have to utilize clinical acumen, EKG, Troponin and serial changes to determine if it is an Acute Myocardial Infarction or myocardial injury due to an underlying chronic condition.       Results may be falsely decreased if patient taking Biotin.      Lipase [[id number]]  (Normal) Collected: 03/04/22 1928    Specimen: Blood Updated: 03/04/22 2011     Lipase 24 U/L     BNP [[id number]]  (Normal) Collected: 03/04/22 1928    Specimen: Blood Updated: 03/04/22 2010     proBNP 8.9 pg/mL     Narrative:      Among patients with dyspnea, NT-proBNP is highly sensitive for the detection of acute congestive heart failure. In addition NT-proBNP of <300 pg/ml effectively rules out acute congestive heart failure with 99% negative predictive value.    Results may be falsely decreased if patient taking Biotin.      CBC & Differential [[id number]]  (Abnormal) Collected: 03/04/22 1928    Specimen: Blood Updated: 03/04/22 1951    Narrative:      The following orders were created for panel order CBC & Differential.  Procedure                               Abnormality         Status                     ---------                               -----------         ------                     CBC Auto Differential[[id number]]        Abnormal            Final result                 Please view results for these tests on the individual orders.    CBC Auto Differential [[id number]]  (Abnormal) Collected: 03/04/22 1928    Specimen: Blood Updated:  03/04/22 1951     WBC 9.40 10*3/mm3      RBC 5.10 10*6/mm3      Hemoglobin 14.6 g/dL      Hematocrit 44.1 %      MCV 86.5 fL      MCH 28.6 pg      MCHC 33.1 g/dL      RDW 12.4 %      RDW-SD 39.0 fl      MPV 9.0 fL      Platelets 233 10*3/mm3      Neutrophil % 57.2 %      Lymphocyte % 34.7 %      Monocyte % 5.9 %      Eosinophil % 1.6 %      Basophil % 0.4 %      Immature Grans % 0.2 %      Neutrophils, Absolute 5.38 10*3/mm3      Lymphocytes, Absolute 3.26 10*3/mm3      Monocytes, Absolute 0.55 10*3/mm3      Eosinophils, Absolute 0.15 10*3/mm3      Basophils, Absolute 0.04 10*3/mm3      Immature Grans, Absolute 0.02 10*3/mm3      nRBC 0.0 /100 WBC           Imaging Results (Last 72 Hours)     Procedure Component Value Units Date/Time    CT Angiogram Chest [[id number]] Collected: 03/04/22 2145     Updated: 03/04/22 2159    Narrative:      DATE OF EXAM: 3/4/2022 8:52 PM     PROCEDURE: CT ANGIOGRAM CHEST-     INDICATIONS: left sided CP, SOA, pain radiates to upper back, r/o PE     COMPARISON: No comparisons available.     TECHNIQUE: Contiguous axial imaging was obtained from the thoracic inlet  through the upper abdomen following the intravenous administration of 69  mL of Isovue 370. Reconstructed coronal and sagittal images were also  obtained. Automated exposure control and iterative reconstruction  methods were used.     The radiation dose reduction device was turned on for each scan per the  ALARA (As Low as Reasonably Achievable) protocol.     FINDINGS:  There is good contrast opacification of the pulmonary arteries. No  filling defects are seen to suggest pulmonary embolic disease. There is  no evidence of thoracic aortic aneurysm or dissection, pericardial or  pleural effusion, mediastinal, hilar, or axillary adenopathy. No  significant coronary artery calcification is seen. Lungs appear clear of  active disease. A couple of minute granulomatous calcifications are  incidentally noted. There is extensive fatty  liver change. Gallbladder  surgically absent. No upper abdominal inflammatory change or ascites is  seen.          Impression:      No evidence of pulmonary embolus, pneumonia or other active chest  disease.     This report was finalized on 3/4/2022 9:56 PM by Dr. Huang Roque MD.       CT Head Without Contrast [[id number]] Collected: 03/04/22 2142     Updated: 03/04/22 2156    Narrative:      DATE OF EXAM: 3/4/2022 8:52 PM     PROCEDURE: CT HEAD WO CONTRAST-     INDICATIONS: left sided numbness/tingling, r/o acute CVA     COMPARISON: No comparisons available.     TECHNIQUE: Routine transaxial and coronal reconstruction images were  obtained through the head without the administration of contrast.  Automated exposure control and iterative reconstruction methods were  used.     The radiation dose reduction device was turned on for each scan per the  ALARA (As Low as Reasonably Achievable) protocol.     FINDINGS:  The calvarium appears intact. Included paranasal sinuses and mastoids  appear clear. Soft tissue window images show a mild degree of  generalized cerebral atrophy within expected limits for the patient's  age. There is no evidence of hemorrhage, contusion, edema, mass, mass  effect, infarct, hydrocephalus, or abnormal extra-axial collection.        Impression:      No evidence of acute intracranial disease.     This report was finalized on 3/4/2022 9:45 PM by Dr. Huang Roque MD.       XR Chest 1 View [[id number]] Collected: 03/04/22 2055     Updated: 03/04/22 2059    Narrative:      DATE OF EXAM: 3/4/2022 8:19 PM     PROCEDURE: XR CHEST 1 VW-     INDICATIONS: Chest Pain triage protocol     COMPARISON: No comparisons available.     TECHNIQUE: Single radiographic AP view of the chest was obtained.     FINDINGS:  Heart mediastinum and pulmonary vasculature appear within normal limits.  Lungs appear normally inflated and grossly clear. Previous cervical  spine fusion is noted.        Impression:      No evidence of  active chest disease.     This report was finalized on 3/4/2022 8:56 PM by Dr. Huang Roque MD.           Operative/Procedure Notes (last 72 hours)  Notes from 22 1029 through 22 1029   No notes of this type exist for this encounter.            Physician Progress Notes (last 72 hours)      Neda Morton DO at 22 1342              [facility] Medicine Services  PROGRESS NOTE    Patient Name: Nighat Oden  : 1965  MRN: [id number]    Date of Admission: 3/4/2022  Primary Care Physician: Fausto Powers MD    Subjective   Subjective     CC:  Chest Pain    HPI:  Patient lying in bed, states she is having on going chest discomfort with radiation to her neck and dizziness. Patient states she is having intermittent tachycardia. Overall, patient appears to not feel well. Carotid ultrasound underway while I was in the room. Denies n/v.    ROS:  Gen- No fevers, chills, +headache and dizziness  CV- + chest pain, palpitations  Resp- No cough, dyspnea  GI- No N/V/D, abd pain     Objective   Objective     Vital Signs:   Temp:  [97.7 °F (36.5 °C)-97.9 °F (36.6 °C)] 97.9 °F (36.6 °C)  Heart Rate:  [63-80] 67  Resp:  [16-17] 17  BP: ()/(47-98) 118/98     Physical Exam:  Constitutional: No acute distress, awake, alert, ill appearing  HENT: NCAT, mucous membranes moist  Respiratory: Clear to auscultation bilaterally, respiratory effort normal   Cardiovascular: RRR, no murmurs, rubs, or gallops  Gastrointestinal: Positive bowel sounds, soft, nontender, nondistended  Musculoskeletal: No bilateral ankle edema  Psychiatric: Appropriate affect, cooperative  Neurologic: Oriented x 3, strength symmetric in all extremities, Cranial Nerves grossly intact to confrontation, speech clear  Skin: No rashes    Results Reviewed:  LAB RESULTS:      Lab 22  1109 22  1928   WBC 7.75 9.40   HEMOGLOBIN 13.4 14.6   HEMATOCRIT 43.6 44.1   PLATELETS 176 233   NEUTROS ABS 4.09 5.38    IMMATURE GRANS (ABS) 0.01 0.02   LYMPHS ABS 2.94 3.26*   MONOS ABS 0.58 0.55   EOS ABS 0.10 0.15   MCV 94.2 86.5         Lab 03/05/22  1109 03/04/22  2333 03/04/22 1928   SODIUM 138  --  142   POTASSIUM 4.1  --  4.6   CHLORIDE 107  --  106   CO2 19.0*  --  25.0   ANION GAP 12.0  --  11.0   BUN 10  --  10   CREATININE 0.62  --  0.79   EGFR 105.3  --  88.5   GLUCOSE 129*  --  116*   CALCIUM 9.7  --  10.6*   IONIZED CALCIUM  --  1.42*  --    MAGNESIUM  --   --  2.2   HEMOGLOBIN A1C  --   --  8.80*   TSH  --   --  1.640         Lab 03/04/22 1928   TOTAL PROTEIN 7.3   ALBUMIN 4.90   GLOBULIN 2.4   ALT (SGPT) 47*   AST (SGOT) 33*   BILIRUBIN <0.2   ALK PHOS 100   LIPASE 24         Lab 03/04/22 2129 03/04/22 1928   PROBNP  --  8.9   TROPONIN T <0.010 <0.010         Lab 03/05/22  1109   CHOLESTEROL 108   LDL CHOL 50   HDL CHOL 35*   TRIGLYCERIDES 128             Brief Urine Lab Results     None          Microbiology Results Abnormal     Procedure Component Value - Date/Time    COVID PRE-OP / PRE-PROCEDURE SCREENING ORDER (NO ISOLATION) - Swab, Nasopharynx [[id number]]  (Normal) Collected: 03/04/22 2229    Lab Status: Final result Specimen: Swab from Nasopharynx Updated: 03/04/22 2259    Narrative:      The following orders were created for panel order COVID PRE-OP / PRE-PROCEDURE SCREENING ORDER (NO ISOLATION) - Swab, Nasopharynx.  Procedure                               Abnormality         Status                     ---------                               -----------         ------                     COVID-19 and FLU A/B PCR...[[id number]]  Normal              Final result                 Please view results for these tests on the individual orders.    COVID-19 and FLU A/B PCR - Swab, Nasopharynx [[id number]]  (Normal) Collected: 03/04/22 2229    Lab Status: Final result Specimen: Swab from Nasopharynx Updated: 03/04/22 2259     COVID19 Not Detected     Influenza A PCR Not Detected     Influenza B PCR Not Detected     Narrative:      Fact sheet for providers: https://www.fda.gov/media/891667/download    Fact sheet for patients: https://www.fda.gov/media/587829/download    Test performed by [facility].          CT Head Without Contrast    Result Date: 3/4/2022  DATE OF EXAM: 3/4/2022 8:52 PM  PROCEDURE: CT HEAD WO CONTRAST-  INDICATIONS: left sided numbness/tingling, r/o acute CVA  COMPARISON: No comparisons available.  TECHNIQUE: Routine transaxial and coronal reconstruction images were obtained through the head without the administration of contrast. Automated exposure control and iterative reconstruction methods were used.  The radiation dose reduction device was turned on for each scan per the ALARA (As Low as Reasonably Achievable) protocol.  FINDINGS: The calvarium appears intact. Included paranasal sinuses and mastoids appear clear. Soft tissue window images show a mild degree of generalized cerebral atrophy within expected limits for the patient's age. There is no evidence of hemorrhage, contusion, edema, mass, mass effect, infarct, hydrocephalus, or abnormal extra-axial collection.      Impression: No evidence of acute intracranial disease.  This report was finalized on 3/4/2022 9:45 PM by Dr. Huang Roque MD.      XR Chest 1 View    Result Date: 3/4/2022  DATE OF EXAM: 3/4/2022 8:19 PM  PROCEDURE: XR CHEST 1 VW-  INDICATIONS: Chest Pain triage protocol  COMPARISON: No comparisons available.  TECHNIQUE: Single radiographic AP view of the chest was obtained.  FINDINGS: Heart mediastinum and pulmonary vasculature appear within normal limits. Lungs appear normally inflated and grossly clear. Previous cervical spine fusion is noted.      Impression: No evidence of active chest disease.  This report was finalized on 3/4/2022 8:56 PM by Dr. Huang Roque MD.      CT Angiogram Chest    Result Date: 3/4/2022  DATE OF EXAM: 3/4/2022 8:52 PM  PROCEDURE: CT ANGIOGRAM CHEST-  INDICATIONS: left sided CP, SOA, pain radiates to upper back, r/o PE   COMPARISON: No comparisons available.  TECHNIQUE: Contiguous axial imaging was obtained from the thoracic inlet through the upper abdomen following the intravenous administration of 69 mL of Isovue 370. Reconstructed coronal and sagittal images were also obtained. Automated exposure control and iterative reconstruction methods were used.  The radiation dose reduction device was turned on for each scan per the ALARA (As Low as Reasonably Achievable) protocol.  FINDINGS: There is good contrast opacification of the pulmonary arteries. No filling defects are seen to suggest pulmonary embolic disease. There is no evidence of thoracic aortic aneurysm or dissection, pericardial or pleural effusion, mediastinal, hilar, or axillary adenopathy. No significant coronary artery calcification is seen. Lungs appear clear of active disease. A couple of minute granulomatous calcifications are incidentally noted. There is extensive fatty liver change. Gallbladder surgically absent. No upper abdominal inflammatory change or ascites is seen.       Impression: No evidence of pulmonary embolus, pneumonia or other active chest disease.  This report was finalized on 3/4/2022 9:56 PM by Dr. Huang Roque MD.            I have reviewed the medications:  Scheduled Meds:atorvastatin, 10 mg, Oral, Daily  cyclobenzaprine, 5 mg, Oral, Nightly  enoxaparin, 40 mg, Subcutaneous, Q24H  insulin lispro, 0-7 Units, Subcutaneous, TID AC  lisinopril, 5 mg, Oral, Daily  sodium chloride, 10 mL, Intravenous, Q12H      Continuous Infusions:   PRN Meds:.dextrose  •  dextrose  •  glucagon (human recombinant)  •  melatonin  •  ondansetron **OR** ondansetron  •  sodium chloride  •  sodium chloride    Assessment/Plan   Assessment & Plan     Active Hospital Problems    Diagnosis  POA   • Chest pain [R07.9]  Yes   • Elevated liver enzymes [R74.8]  Yes   • Type 2 diabetes mellitus (HCC) [E11.9]  Yes   • Essential hypertension [I10]  Yes   • Hyperlipidemia [E78.5]  Yes    • Hypercalcemia [E83.52]  Yes      Resolved Hospital Problems   No resolved problems to display.        Brief Hospital Course to date:  Nighat Oden is a 56 y.o. female with a history of type 2 diabetes, hypertension, hyperlipidemia, anxiety who presents to [facility] ED with complaint of intermittent chest pain for 3 weeks. Is currently being worked up by, Dr. Roland Nelson at [facility] and just turned in Holter monitor. Had a chemical stress test week before last but hasn't heard back on that. Currently chest pain free.    This patient's problems and plans were partially entered by my partner and updated as appropriate by me 03/05/22.    All problems are new to me today.     Chest Pain  -Patient currently complains of ongoing chest pain with radiation to the neck and dizziness.  - vitals and BP stable.  -CXR unremarkable.  -CTA chest unremarkable  -Troponin negative x2.  -EKG shows no ischemic ST changes.  -Mag normal  -ECHO pending  -Cardiology consulted  - given in the ED  -N.p.o. until seen by cardiology  -Monitor on telemetry  -We will get a carotid duplex performed today negative.  - Ongoing dizziness with some possible intermittent paresthesia with these episodes.   -CT head without contrast shows no acute intracranial disease.     Hypertension  Hyperlipidemia  -Continue home lisinopril  -Continue statin  -Lipid panel within normal limits     Type 2 diabetes mellitus  -Blood glucose 116 on arrival  -HgA1C: 8.8  -Hold home oral meds  -Fingerstick ACHS. SSI     Elevated liver enzymes  -Hepatitis panel pending.  -May consider RUQ ultrasound.     Hypercalcemia  -Check ionized calcium  -Check Vit D25 hydroxy, and D 1,25 Dihydroxy    DVT prophylaxis:  Medical DVT prophylaxis orders are present.       AM-PAC 6 Clicks Score (PT): 24 (03/05/22 0800)    Disposition: I expect the patient to be discharged TBD.    CODE STATUS:   Code Status and Medical Interventions:   Ordered at: 03/05/22 0013      Code Status (Patient has no pulse and is not breathing):    CPR (Attempt to Resuscitate)     Medical Interventions (Patient has pulse or is breathing):    Full Support       Neda Morton DO  03/05/22                Electronically signed by Neda Morton DO at 03/05/22 1348          Consult Notes (last 72 hours)      Fabien Urbina MD at 03/05/22 1621      Consult Orders    1. Inpatient Cardiology Consult [[id number]] ordered by Dianelys Macias PA-C at 03/05/22 0013               [facility]   Consult Note    Referring Provider: AGUSTIN Macias    Reason for Consultation: chest pain    Patient Care Team:  Fausto Powers MD as PCP - General (Internal Medicine)  Tristan Yi MD (Inactive) as Consulting Physician (Gynecology)     Problem List:  1. Chest pain  1. Normal echo 3/5/22 and normal MPS 2/24/22  2. Hypertension  3. Diabetes mellitus  4. Dyslipidemia   5. COVID-19 11/2021  6. Obesity  7. T-spine disc compression  8. Arthritis  9. Sleep apnea on CPAP  10. History of nephrolithiasis  11. Surgeries:  1. Breast biopsy  2. Cervical discectomy  3. Cholecystectomy  4. Total hysterectomy   5. Bladder surgery  6. Left wrist surgery  7. Bilateral knee arthroscopy      No Known Allergies        Current Facility-Administered Medications:   •  atorvastatin (LIPITOR) tablet 10 mg, 10 mg, Oral, Daily, Dianelys Macias PA-C  •  cyclobenzaprine (FLEXERIL) tablet 5 mg, 5 mg, Oral, Nightly, Dianelys Macias PA-C  •  dextrose (D50W) (25 g/50 mL) IV injection 25 g, 25 g, Intravenous, Q15 Min PRN, Dianelys Macias PA-C  •  dextrose (GLUTOSE) oral gel 15 g, 15 g, Oral, Q15 Min PRN, Dianelys Macias PA-C  •  enoxaparin (LOVENOX) syringe 40 mg, 40 mg, Subcutaneous, Q24H, Dianelys Macias PA-C  •  glucagon (human recombinant) (GLUCAGEN DIAGNOSTIC) injection 1 mg, 1 mg, Intramuscular, Q15 Min PRN, Dianelys Macias PA-C  •  insulin lispro  (humaLOG) injection 0-7 Units, 0-7 Units, Subcutaneous, TID AC, Dianelys Macias PA-C  •  lisinopril (PRINIVIL,ZESTRIL) tablet 5 mg, 5 mg, Oral, Daily, Dianelys Macias PA-C  •  melatonin tablet 5 mg, 5 mg, Oral, Nightly PRN, Dianelys Macias PA-C  •  ondansetron (ZOFRAN) tablet 4 mg, 4 mg, Oral, Q6H PRN **OR** ondansetron (ZOFRAN) injection 4 mg, 4 mg, Intravenous, Q6H PRN, Dianelys Macias PA-C  •  sodium chloride 0.9 % flush 10 mL, 10 mL, Intravenous, PRN, Tian Ruiz,   •  sodium chloride 0.9 % flush 10 mL, 10 mL, Intravenous, Q12H, Dianelys Macias PA-C  •  sodium chloride 0.9 % flush 10 mL, 10 mL, Intravenous, PRN, Dianelys Macias PA-C         Medications Prior to Admission   Medication Sig Dispense Refill Last Dose   • Blood Glucose Monitoring Suppl (ONE TOUCH ULTRA MINI) W/DEVICE kit   0    • cyclobenzaprine (FLEXERIL) 5 MG tablet Take 1 tablet by mouth Every Night.  0 3/3/2022   • EASY TOUCH LANCETS 30G/TWIST misc   3    • glimepiride (AMARYL) 4 MG tablet Take 1 tablet by mouth Daily.  3    • HYDROcodone-acetaminophen (NORCO) 7.5-325 MG per tablet Take 1 tablet by mouth As Needed.  0    • lisinopril (PRINIVIL,ZESTRIL) 5 MG tablet Take 1 tablet by mouth Daily.  3    • metFORMIN (GLUCOPHAGE) 500 MG tablet Take 1 tablet by mouth 2 (Two) Times a Day.  3    • ONE TOUCH ULTRA TEST test strip   3    • simvastatin (ZOCOR) 20 MG tablet Take 1 tablet by mouth Daily.  3          Subjective .   History of present illness:    Patient is a 56-year-old  female who is being seen today for further evaluation of chest pain and dizziness.  She has no previous history of coronary disease but does have multiple risk factors.  Patient notes over the last few months recurrent chest pain and dizziness and feeling as if she is going to pass out.  She has had a Holter monitor performed which she mailed back last week.  Currently results are not available.  She  also had a myocardial perfusion study done last month which was negative for ischemia.  Typically her symptoms occur at rest.  She notes a dizzy sensation and then a flush sensation that goes across her and almost like ice water down her left side.  Has not noted any significant exertional symptoms.  No true loss of consciousness.  Cardiac studies thus far have been negative.      Social History     Socioeconomic History   • Marital status:    Tobacco Use   • Smoking status: Former Smoker   • Smokeless tobacco: Never Used   Substance and Sexual Activity   • Alcohol use: No   • Drug use: No   • Sexual activity: Yes     Partners: Male     Birth control/protection: Surgical     Family History   Problem Relation Age of Onset   • Diabetes Father    • Pulmonary embolism Father    • Cancer Father    • Breast cancer Mother    • Diabetes Mother          Review of Systems:  Review of Systems   Constitutional: Positive for malaise/fatigue and night sweats. Negative for fever.   HENT: Negative for nosebleeds.    Eyes: Negative for redness and visual disturbance.   Cardiovascular: Positive for chest pain. Negative for orthopnea, palpitations and paroxysmal nocturnal dyspnea.   Respiratory: Positive for shortness of breath. Negative for cough, snoring, sputum production and wheezing.    Hematologic/Lymphatic: Negative for bleeding problem.   Skin: Negative for flushing, itching and rash.   Musculoskeletal: Positive for arthritis. Negative for falls, joint pain and muscle cramps.   Gastrointestinal: Negative for abdominal pain, diarrhea, heartburn, nausea and vomiting.   Genitourinary: Negative for hematuria.   Neurological: Positive for dizziness. Negative for excessive daytime sleepiness, headaches, tremors and weakness.   Psychiatric/Behavioral: Negative for substance abuse. The patient is nervous/anxious.               Objective   Vitals:  /81 (BP Location: Right arm, Patient Position: Lying)   Pulse 101   Temp  "97.9 °F (36.6 °C) (Oral)   Resp 17   Ht 170.2 cm (67\")   Wt 115 kg (254 lb 8 oz)   LMP  (LMP Unknown)   SpO2 98%   BMI 39.86 kg/m²        Vitals reviewed.   Constitutional:       Appearance: Well-developed and not in distress.      Comments: Obese   Neck:      Vascular: No JVD.      Trachea: No tracheal deviation.   Pulmonary:      Effort: Pulmonary effort is normal.      Breath sounds: Normal breath sounds.   Cardiovascular:      Normal rate. Regular rhythm.   Pulses:     Intact distal pulses.   Edema:     Peripheral edema absent.   Abdominal:      General: Bowel sounds are normal.      Palpations: Abdomen is soft.      Tenderness: There is no abdominal tenderness.   Musculoskeletal:         General: No deformity. Skin:     General: Skin is warm and dry.   Neurological:      Mental Status: Alert and oriented to person, place, and time.     I have examined the patient and agree with the above           Results Review:  I reviewed the patient's new clinical results.  Results from last 7 days   Lab Units 03/05/22  1109   WBC 10*3/mm3 7.75   HEMOGLOBIN g/dL 13.4   HEMATOCRIT % 43.6   PLATELETS 10*3/mm3 176     Results from last 7 days   Lab Units 03/05/22  1109 03/04/22  1928   SODIUM mmol/L 138 142   POTASSIUM mmol/L 4.1 4.6   CHLORIDE mmol/L 107 106   CO2 mmol/L 19.0* 25.0   BUN mg/dL 10 10   CREATININE mg/dL 0.62 0.79   CALCIUM mg/dL 9.7 10.6*   BILIRUBIN mg/dL  --  <0.2   ALK PHOS U/L  --  100   ALT (SGPT) U/L  --  47*   AST (SGOT) U/L  --  33*   GLUCOSE mg/dL 129* 116*     Results from last 7 days   Lab Units 03/05/22  1109   SODIUM mmol/L 138   POTASSIUM mmol/L 4.1   CHLORIDE mmol/L 107   CO2 mmol/L 19.0*   BUN mg/dL 10   CREATININE mg/dL 0.62   GLUCOSE mg/dL 129*   CALCIUM mg/dL 9.7         Lab Results   Lab Value Date/Time    TROPONINT <0.010 03/04/2022 2129    TROPONINT <0.010 03/04/2022 1928     Results from last 7 days   Lab Units 03/04/22 1928   TSH uIU/mL 1.640     Results from last 7 days   Lab " "Units 03/05/22  1109   CHOLESTEROL mg/dL 108   TRIGLYCERIDES mg/dL 128   HDL CHOL mg/dL 35*   LDL CHOL mg/dL 50     Results from last 7 days   Lab Units 03/04/22  1928   PROBNP pg/mL 8.9     ECHO:  · Estimated left ventricular EF = 60%  · The cardiac valves are anatomically and functionally normal.      Tele:  SR    EKG: SR no acute changes.       Assessment/Plan     1. Chest pain, atypical.  Normal [place] scan done at [facility] last month.  Echocardiogram overall normal.  No noted carotid disease.  2. Recurrent dizziness, unclear etiology.  Had recent event monitor which we do not currently have results of.  Will likely not be able to get until next week.  3. Hypertension, stable.  Reported hypotension at home.  May need less medical therapy at time of discharge.      Plan:    1. Unclear cause of symptoms at this time.  Normal stress test echocardiogram and carotid duplex.  (Have reviewed the results of the stress test done at [facility] last week).  2. Cardiac monitor is pending, we are unlikely to have the results of this for at least another week.  3. Despite his \"mild\" symptoms, there is been no arrhythmia noted on monitor this admission  4. We will check orthostatics ensure this is the cause of her symptoms  5. Consider screening for pheochromocytoma or carcinoid with 5 HIAA.  Although this is unlikely.  6. However, overall from cardiac standpoint no significant abnormalities.  Monitor could be followed up on as an outpatient basis.    FORREST Preston obtained past medical, family history, social history, review of systems and functioned as a scribe for the remainder of the dictation for Dr. Urbina.     I have examined the patient and agree with the abov  I have seen and examined the patient, I have reviewed the note, discussed the case with the advance practice clinician, made necessary changes and I agree with the final note.    Fabien Urbina MD  03/05/22  19:13 EST          Dictated " "utilizing Dragon dictation      Electronically signed by Fabien rUbina MD at 03/05/22 1913         Buffy Riggs LPN    Licensed Nurse   Clinic   Plan of Care       Signed   Date of Service:  03/06/22 0229   Creation Time:  03/06/22 0229              Signed            Goal Outcome Evaluation:  Plan of Care Reviewed With: patient, spouse  Progress: no change  Outcome Evaluation: VSS; NSR and appears to be having runs of Aflutter; pt states she gets light headed and very dizzy; and her head begins to hurt , and these runs appear on monitor; remains on room, but states \"still getting SOA with exertion\" declined use of Cpap; notified NP of pt's c/o headache; prn tylenol given x 1; call light within her reach; will continue to monitor                    "

## 2022-03-06 NOTE — PROGRESS NOTES
"  [facility]   Inpatient Progress Note       LOS: 0 days   Patient Care Team:  Fausto Powers MD as PCP - General (Internal Medicine)  Tristan Yi MD (Inactive) as Consulting Physician (Gynecology)    Chief Complaint: Palpitations dizziness chest pain    Subjective     Interval History:   Patient currently getting cardiac MRI.  No ongoing dizziness and palpitations last evening.  Was reportedly flutter per RN at bedside last night, however reviewed all strips printed, and strips throughout the evening, there is no evidence of A. fib flutter, nor any arrhythmias      Review of Systems:   Pertinent positives noted in history, exam, and assessment. Otherwise reviewed and negative.      Objective     Vitals:  Blood pressure 131/59, pulse 79, temperature 97.8 °F (36.6 °C), temperature source Oral, resp. rate 18, height 170.2 cm (67\"), weight 115 kg (254 lb 8 oz), SpO2 96 %.   No intake or output data in the 24 hours ending 03/06/22 1151  Vitals reviewed.   Constitutional:       Appearance: Well-developed and not in distress.   Neck:      Thyroid: No thyromegaly.      Vascular: No carotid bruit or JVD.   Pulmonary:      Breath sounds: Normal breath sounds.   Cardiovascular:      Regular rhythm.      No gallop. No S3 and S4 gallop.   Edema:     Peripheral edema absent.   Abdominal:      General: Bowel sounds are normal.      Palpations: Abdomen is soft. There is no abdominal mass.      Tenderness: There is no abdominal tenderness.   Musculoskeletal:         General: No deformity.      Extremities: No clubbing present.Skin:     General: Skin is warm and dry.      Findings: No rash.   Neurological:      Mental Status: Alert and oriented to person, place, and time.            Results Review:     I reviewed the patient's new clinical results.    Results from last 7 days   Lab Units 03/06/22  0350   WBC 10*3/mm3 8.57   HEMOGLOBIN g/dL 13.8   HEMATOCRIT % 41.2   PLATELETS 10*3/mm3 197 "     Results from last 7 days   Lab Units 03/06/22  0350   SODIUM mmol/L 139   POTASSIUM mmol/L 3.8   CHLORIDE mmol/L 104   CO2 mmol/L 21.0*   BUN mg/dL 12   CREATININE mg/dL 0.71   CALCIUM mg/dL 10.1   BILIRUBIN mg/dL 0.4   ALK PHOS U/L 95   ALT (SGPT) U/L 36*   AST (SGOT) U/L 23   GLUCOSE mg/dL 116*     Results from last 7 days   Lab Units 03/06/22  0350   SODIUM mmol/L 139   POTASSIUM mmol/L 3.8   CHLORIDE mmol/L 104   CO2 mmol/L 21.0*   BUN mg/dL 12   CREATININE mg/dL 0.71   GLUCOSE mg/dL 116*   CALCIUM mg/dL 10.1         No results found for: TROPONINI  Results from last 7 days   Lab Units 03/04/22  1928   TSH uIU/mL 1.640     Results from last 7 days   Lab Units 03/05/22  1109   CHOLESTEROL mg/dL 108   TRIGLYCERIDES mg/dL 128   HDL CHOL mg/dL 35*   LDL CHOL mg/dL 50     Results from last 7 days   Lab Units 03/04/22  1928   PROBNP pg/mL 8.9         Tele: Sinus rhythm    Assessment/Plan       Chest pain    Elevated liver enzymes    Type 2 diabetes mellitus (HCC)    Essential hypertension    Hyperlipidemia    Hypercalcemia    Atypical chest pain      1.  Atypical chest pain normal ischemic evaluation.  Normal LVEF.  Noncardiac in nature  2.  Recurrent dizziness.  She has persistent symptoms despite normal blood pressure and heart rate.  Suspect this is noncardiac.  She had a prolonged event recorder as an outpatient results which we do not have.  Was done at [facility].  3.  Reported atrial flutter, have reviewed all strips from last evening, there is no evidence of this.  Northera any elevated heart rate to explain symptoms  4.  Labile hypertension.  Currently controlled    Plan:  · So far no cardiovascular testing abnormalities explain her symptoms.  May be neurologic and/or stress related  · With screening for pheochromocytoma and 5-HIAA if patient to remain in the hospital.  · Await records, we will see again on Tuesday unless interval testing shows an abnormality.    Fabien Urbina,  MD    Dictated utilizing Dragon dictation

## 2022-03-06 NOTE — PROGRESS NOTES
"    [facility]  PROGRESS NOTE    Patient Name: Nighat Oden  : 1965  MRN: [id number]    Date of Admission: 3/4/2022  Primary Care Physician: Fausto Powers MD    Subjective   Subjective     CC:  Chest Pain    HPI:  Patient lying in bed with significant other at bedside. Patient states she had an \"episode\" of chest pain with abnormalities on telemetry overnight. She states the chest pain radiated to her left arm, but not her neck. She states it resolved within about 20 minutes. Patient continues to have ongoing dizziness.    ROS:  Gen- No fevers, chills, +headache and dizziness  CV- + chest pain, palpitations  Resp- No cough, dyspnea  GI- No N/V/D, abd pain     Objective   Objective     Vital Signs:   Temp:  [97.8 °F (36.6 °C)-98.2 °F (36.8 °C)] 97.8 °F (36.6 °C)  Heart Rate:  [] 79  Resp:  [16-18] 18  BP: (115-143)/(57-84) 131/59     Physical Exam:  Constitutional: No acute distress, awake, alert  HENT: NCAT, mucous membranes moist  Respiratory: Clear to auscultation bilaterally, respiratory effort normal   Cardiovascular: RRR, no murmurs, rubs, or gallops  Gastrointestinal: Positive bowel sounds, soft, nontender, nondistended  Musculoskeletal: No bilateral ankle edema  Psychiatric: Appropriate affect, cooperative  Neurologic: Oriented x 3, strength symmetric in all extremities, Cranial Nerves grossly intact to confrontation, speech clear  Skin: No rashes    Results Reviewed:  LAB RESULTS:      Lab 22  0350 22   WBC 8.57 7.75 9.40   HEMOGLOBIN 13.8 13.4 14.6   HEMATOCRIT 41.2 43.6 44.1   PLATELETS 197 176 233   NEUTROS ABS 4.06 4.09 5.38   IMMATURE GRANS (ABS) 0.02 0.01 0.02   LYMPHS ABS 3.57* 2.94 3.26*   MONOS ABS 0.71 0.58 0.55   EOS ABS 0.16 0.10 0.15   MCV 86.0 94.2 86.5         Lab 22  0350 22  1102204/22  1928   SODIUM 139 138  --  142   POTASSIUM 3.8 4.1  --  4.6   CHLORIDE 104 107  " --  106   CO2 21.0* 19.0*  --  25.0   ANION GAP 14.0 12.0  --  11.0   BUN 12 10  --  10   CREATININE 0.71 0.62  --  0.79   EGFR 100.6 105.3  --  88.5   GLUCOSE 116* 129*  --  116*   CALCIUM 10.1 9.7  --  10.6*   IONIZED CALCIUM  --   --  1.42*  --    MAGNESIUM  --   --   --  2.2   HEMOGLOBIN A1C  --   --   --  8.80*   TSH  --   --   --  1.640         Lab 03/06/22  0350 03/04/22 1928   TOTAL PROTEIN 6.6 7.3   ALBUMIN 4.30 4.90   GLOBULIN 2.3 2.4   ALT (SGPT) 36* 47*   AST (SGOT) 23 33*   BILIRUBIN 0.4 <0.2   ALK PHOS 95 100   LIPASE  --  24         Lab 03/04/22 2129 03/04/22 1928   PROBNP  --  8.9   TROPONIN T <0.010 <0.010         Lab 03/05/22  1109   CHOLESTEROL 108   LDL CHOL 50   HDL CHOL 35*   TRIGLYCERIDES 128             Brief Urine Lab Results     None          Microbiology Results Abnormal     Procedure Component Value - Date/Time    COVID PRE-OP / PRE-PROCEDURE SCREENING ORDER (NO ISOLATION) - Swab, Nasopharynx [[id number]]  (Normal) Collected: 03/04/22 2229    Lab Status: Final result Specimen: Swab from Nasopharynx Updated: 03/04/22 2259    Narrative:      The following orders were created for panel order COVID PRE-OP / PRE-PROCEDURE SCREENING ORDER (NO ISOLATION) - Swab, Nasopharynx.  Procedure                               Abnormality         Status                     ---------                               -----------         ------                     COVID-19 and FLU A/B PCR...[[id number]]  Normal              Final result                 Please view results for these tests on the individual orders.    COVID-19 and FLU A/B PCR - Swab, Nasopharynx [[id number]]  (Normal) Collected: 03/04/22 2229    Lab Status: Final result Specimen: Swab from Nasopharynx Updated: 03/04/22 2259     COVID19 Not Detected     Influenza A PCR Not Detected     Influenza B PCR Not Detected    Narrative:      Fact sheet for providers: https://www.fda.gov/media/893308/download    Fact sheet for patients:  https://www.fda.gov/media/995095/download    Test performed by PCR.          Adult Transthoracic Echo Complete W/ Cont if Necessary Per Protocol    Result Date: 3/5/2022  · Estimated left ventricular EF = 60% · The cardiac valves are anatomically and functionally normal.      CT Head Without Contrast    Result Date: 3/4/2022  DATE OF EXAM: 3/4/2022 8:52 PM  PROCEDURE: CT HEAD WO CONTRAST-  INDICATIONS: left sided numbness/tingling, r/o acute CVA  COMPARISON: No comparisons available.  TECHNIQUE: Routine transaxial and coronal reconstruction images were obtained through the head without the administration of contrast. Automated exposure control and iterative reconstruction methods were used.  The radiation dose reduction device was turned on for each scan per the ALARA (As Low as Reasonably Achievable) protocol.  FINDINGS: The calvarium appears intact. Included paranasal sinuses and mastoids appear clear. Soft tissue window images show a mild degree of generalized cerebral atrophy within expected limits for the patient's age. There is no evidence of hemorrhage, contusion, edema, mass, mass effect, infarct, hydrocephalus, or abnormal extra-axial collection.      Impression: No evidence of acute intracranial disease.  This report was finalized on 3/4/2022 9:45 PM by Dr. Huang Roque MD.      XR Chest 1 View    Result Date: 3/4/2022  DATE OF EXAM: 3/4/2022 8:19 PM  PROCEDURE: XR CHEST 1 VW-  INDICATIONS: Chest Pain triage protocol  COMPARISON: No comparisons available.  TECHNIQUE: Single radiographic AP view of the chest was obtained.  FINDINGS: Heart mediastinum and pulmonary vasculature appear within normal limits. Lungs appear normally inflated and grossly clear. Previous cervical spine fusion is noted.      Impression: No evidence of active chest disease.  This report was finalized on 3/4/2022 8:56 PM by Dr. Huang Roque MD.      Duplex Carotid Ultrasound CAR    Result Date: 3/5/2022  · Proximal right internal carotid  artery is normal. · Proximal left internal carotid artery plaque without significant stenosis.      CT Angiogram Chest    Result Date: 3/4/2022  DATE OF EXAM: 3/4/2022 8:52 PM  PROCEDURE: CT ANGIOGRAM CHEST-  INDICATIONS: left sided CP, SOA, pain radiates to upper back, r/o PE  COMPARISON: No comparisons available.  TECHNIQUE: Contiguous axial imaging was obtained from the thoracic inlet through the upper abdomen following the intravenous administration of 69 mL of Isovue 370. Reconstructed coronal and sagittal images were also obtained. Automated exposure control and iterative reconstruction methods were used.  The radiation dose reduction device was turned on for each scan per the ALARA (As Low as Reasonably Achievable) protocol.  FINDINGS: There is good contrast opacification of the pulmonary arteries. No filling defects are seen to suggest pulmonary embolic disease. There is no evidence of thoracic aortic aneurysm or dissection, pericardial or pleural effusion, mediastinal, hilar, or axillary adenopathy. No significant coronary artery calcification is seen. Lungs appear clear of active disease. A couple of minute granulomatous calcifications are incidentally noted. There is extensive fatty liver change. Gallbladder surgically absent. No upper abdominal inflammatory change or ascites is seen.       Impression: No evidence of pulmonary embolus, pneumonia or other active chest disease.  This report was finalized on 3/4/2022 9:56 PM by Dr. Huang Roque MD.        Results for orders placed during the hospital encounter of 03/04/22    Adult Transthoracic Echo Complete W/ Cont if Necessary Per Protocol    Interpretation Summary  · Estimated left ventricular EF = 60%  · The cardiac valves are anatomically and functionally normal.      I have reviewed the medications:  Scheduled Meds:atorvastatin, 10 mg, Oral, Daily  cyclobenzaprine, 5 mg, Oral, Nightly  enoxaparin, 40 mg, Subcutaneous, Q24H  insulin lispro, 0-7 Units,  "Subcutaneous, TID AC  lisinopril, 5 mg, Oral, Daily  sodium chloride, 10 mL, Intravenous, Q12H      Continuous Infusions:   PRN Meds:.•  acetaminophen  •  dextrose  •  dextrose  •  glucagon (human recombinant)  •  melatonin  •  ondansetron **OR** ondansetron  •  sodium chloride  •  sodium chloride    Assessment/Plan   Assessment & Plan     Active Hospital Problems    Diagnosis  POA   • Atypical chest pain [R07.89]  Yes   • Chest pain [R07.9]  Yes   • Elevated liver enzymes [R74.8]  Yes   • Type 2 diabetes mellitus (HCC) [E11.9]  Yes   • Essential hypertension [I10]  Yes   • Hyperlipidemia [E78.5]  Yes   • Hypercalcemia [E83.52]  Yes      Resolved Hospital Problems   No resolved problems to display.        Brief Hospital Course to date:  Nighat Oden is a 56 y.o. female with a history of type 2 diabetes, hypertension, hyperlipidemia, anxiety who presents to [facility] ED with complaint of intermittent chest pain for 3 weeks. Is currently being worked up by, Dr. Roland Nelson at [facility] and just turned in Holter monitor. Had a chemical stress test week before last but hasn't heard back on that. Currently chest pain free.    This patient's problems and plans were partially entered by my partner and updated as appropriate by me 03/06/22.     Chest Pain  -Patient currently complains of ongoing chest pain with radiation to the neck and dizziness.  - vitals and BP stable.  -CXR unremarkable.  -CTA chest unremarkable  -Troponin negative x2.  -EKG shows no ischemic ST changes.  -Mag normal  -ECHO stable  -Cardiology consulted and following- does not believe this is cardiac  - attempting to obtain cardiac monitor records from [facility]  - Normal stress test at [facility] with results reviewed by Dr. Urbina  - normal carotid duplex 3/5/22  - cardiology screening for pheochromocytoma and 5-HIAA  - given in the ED  -Monitor on telemetry  - patient reported palpitations and \"spells\" overnight but " telemetry was evaluated by cardiology and there are no rhythm abnormalities.  - Ongoing dizziness with some possible intermittent paresthesia with these episodes. - MRI brain w/wo pending today  -CT head without contrast shows no acute intracranial disease.     Hypertension  Hyperlipidemia  -Continue home lisinopril  -Continue statin  -Lipid panel within normal limits     Type 2 diabetes mellitus  -Blood glucose 116 on arrival  -HgA1C: 8.8  -Hold home oral meds  -Fingerstick ACHS. SSI     Elevated liver enzymes  -Hepatitis panel negative  - trended down  -May consider RUQ ultrasound.     Hypercalcemia  -Check ionized calcium  -Check Vit D25 hydroxy, and D 1,25 Dihydroxy    DVT prophylaxis:  Medical DVT prophylaxis orders are present.       AM-PAC 6 Clicks Score (PT): 23 (03/05/22 2000)    Disposition: I expect the patient to be discharged TBD.    CODE STATUS:   Code Status and Medical Interventions:   Ordered at: 03/05/22 0013     Code Status (Patient has no pulse and is not breathing):    CPR (Attempt to Resuscitate)     Medical Interventions (Patient has pulse or is breathing):    Full Support       Neda Mortno DO  03/06/22

## 2022-03-06 NOTE — PLAN OF CARE
"Goal Outcome Evaluation:  Plan of Care Reviewed With: patient, spouse        Progress: no change  Outcome Evaluation: VSS; NSR and appears to be having runs of Aflutter; pt states she gets light headed and very dizzy; and her head begins to hurt , and these runs appear on monitor; remains on room, but states \"still getting SOA with exertion\" declined use of Cpap; notified NP of pt's c/o headache; prn tylenol given x 1; call light within her reach; will continue to monitor  "

## 2022-03-07 LAB
ALBUMIN SERPL-MCNC: 4.2 G/DL (ref 3.5–5.2)
ALBUMIN/GLOB SERPL: 1.9 G/DL
ALP SERPL-CCNC: 89 U/L (ref 39–117)
ALT SERPL W P-5'-P-CCNC: 34 U/L (ref 1–33)
ANION GAP SERPL CALCULATED.3IONS-SCNC: 14 MMOL/L (ref 5–15)
AST SERPL-CCNC: 19 U/L (ref 1–32)
BASOPHILS # BLD AUTO: 0.05 10*3/MM3 (ref 0–0.2)
BASOPHILS NFR BLD AUTO: 0.5 % (ref 0–1.5)
BILIRUB SERPL-MCNC: 0.2 MG/DL (ref 0–1.2)
BUN SERPL-MCNC: 14 MG/DL (ref 6–20)
BUN/CREAT SERPL: 20.9 (ref 7–25)
CALCIUM SPEC-SCNC: 9.9 MG/DL (ref 8.6–10.5)
CHLORIDE SERPL-SCNC: 104 MMOL/L (ref 98–107)
CO2 SERPL-SCNC: 21 MMOL/L (ref 22–29)
CREAT SERPL-MCNC: 0.67 MG/DL (ref 0.57–1)
DEPRECATED RDW RBC AUTO: 40.8 FL (ref 37–54)
EGFRCR SERPLBLD CKD-EPI 2021: 103.4 ML/MIN/1.73
EOSINOPHIL # BLD AUTO: 0.18 10*3/MM3 (ref 0–0.4)
EOSINOPHIL NFR BLD AUTO: 1.9 % (ref 0.3–6.2)
ERYTHROCYTE [DISTWIDTH] IN BLOOD BY AUTOMATED COUNT: 12.3 % (ref 12.3–15.4)
GLOBULIN UR ELPH-MCNC: 2.2 GM/DL
GLUCOSE BLDC GLUCOMTR-MCNC: 142 MG/DL (ref 70–130)
GLUCOSE BLDC GLUCOMTR-MCNC: 151 MG/DL (ref 70–130)
GLUCOSE BLDC GLUCOMTR-MCNC: 154 MG/DL (ref 70–130)
GLUCOSE BLDC GLUCOMTR-MCNC: 197 MG/DL (ref 70–130)
GLUCOSE SERPL-MCNC: 145 MG/DL (ref 65–99)
HCT VFR BLD AUTO: 42.3 % (ref 34–46.6)
HGB BLD-MCNC: 13.7 G/DL (ref 12–15.9)
IMM GRANULOCYTES # BLD AUTO: 0.02 10*3/MM3 (ref 0–0.05)
IMM GRANULOCYTES NFR BLD AUTO: 0.2 % (ref 0–0.5)
LYMPHOCYTES # BLD AUTO: 4.09 10*3/MM3 (ref 0.7–3.1)
LYMPHOCYTES NFR BLD AUTO: 43.6 % (ref 19.6–45.3)
MCH RBC QN AUTO: 28.9 PG (ref 26.6–33)
MCHC RBC AUTO-ENTMCNC: 32.4 G/DL (ref 31.5–35.7)
MCV RBC AUTO: 89.2 FL (ref 79–97)
MONOCYTES # BLD AUTO: 0.75 10*3/MM3 (ref 0.1–0.9)
MONOCYTES NFR BLD AUTO: 8 % (ref 5–12)
NEUTROPHILS NFR BLD AUTO: 4.29 10*3/MM3 (ref 1.7–7)
NEUTROPHILS NFR BLD AUTO: 45.8 % (ref 42.7–76)
NRBC BLD AUTO-RTO: 0 /100 WBC (ref 0–0.2)
PLATELET # BLD AUTO: 204 10*3/MM3 (ref 140–450)
PMV BLD AUTO: 9.1 FL (ref 6–12)
POTASSIUM SERPL-SCNC: 3.8 MMOL/L (ref 3.5–5.2)
PROT SERPL-MCNC: 6.4 G/DL (ref 6–8.5)
RBC # BLD AUTO: 4.74 10*6/MM3 (ref 3.77–5.28)
SODIUM SERPL-SCNC: 139 MMOL/L (ref 136–145)
WBC NRBC COR # BLD: 9.38 10*3/MM3 (ref 3.4–10.8)

## 2022-03-07 PROCEDURE — 85025 COMPLETE CBC W/AUTO DIFF WBC: CPT | Performed by: PHYSICIAN ASSISTANT

## 2022-03-07 PROCEDURE — 83497 ASSAY OF 5-HIAA: CPT | Performed by: INTERNAL MEDICINE

## 2022-03-07 PROCEDURE — 25010000002 ENOXAPARIN PER 10 MG: Performed by: PHYSICIAN ASSISTANT

## 2022-03-07 PROCEDURE — 99232 SBSQ HOSP IP/OBS MODERATE 35: CPT | Performed by: INTERNAL MEDICINE

## 2022-03-07 PROCEDURE — 81050 URINALYSIS VOLUME MEASURE: CPT | Performed by: INTERNAL MEDICINE

## 2022-03-07 PROCEDURE — 82962 GLUCOSE BLOOD TEST: CPT

## 2022-03-07 PROCEDURE — 83835 ASSAY OF METANEPHRINES: CPT | Performed by: INTERNAL MEDICINE

## 2022-03-07 PROCEDURE — 80053 COMPREHEN METABOLIC PANEL: CPT | Performed by: HOSPITALIST

## 2022-03-07 RX ADMIN — ENOXAPARIN SODIUM 40 MG: 40 INJECTION SUBCUTANEOUS at 09:02

## 2022-03-07 RX ADMIN — ATORVASTATIN CALCIUM 10 MG: 10 TABLET, FILM COATED ORAL at 09:02

## 2022-03-07 RX ADMIN — ACETAMINOPHEN 650 MG: 325 TABLET ORAL at 09:09

## 2022-03-07 RX ADMIN — CYCLOBENZAPRINE 5 MG: 10 TABLET, FILM COATED ORAL at 21:11

## 2022-03-07 RX ADMIN — LISINOPRIL 5 MG: 5 TABLET ORAL at 09:02

## 2022-03-07 RX ADMIN — Medication 5 MG: at 21:11

## 2022-03-07 NOTE — PLAN OF CARE
Goal Outcome Evaluation:  Plan of Care Reviewed With: patient, spouse        Progress: improving  Outcome Evaluation: VSS; NSR on monitor; has had fewer episodes of dizziness and chest pains and headaches this shift; currently has 24 hour urine in progress; resting with eyes closed in no apparent distress; call light within her reach; will continue to monitor

## 2022-03-07 NOTE — PROGRESS NOTES
[facility] Medicine Services  PROGRESS NOTE    Patient Name: Nighat Oden  : 1965  MRN: [id number]    Date of Admission: 3/4/2022  Primary Care Physician: Fausto Powers MD    Subjective   Subjective     CC:  Chest Pain    HPI:  Occasional palpitations today with transient chest pain, otherwise feels OK, has been up walking about the room on her own.     ROS:  Gen- No fevers, chills, +headache and dizziness  CV- + chest pain, palpitations  Resp- No cough, dyspnea  GI- No N/V/D, abd pain     Objective   Objective     Vital Signs:   Temp:  [97.7 °F (36.5 °C)-98.7 °F (37.1 °C)] 97.7 °F (36.5 °C)  Heart Rate:  [72-89] 79  Resp:  [16-18] 18  BP: (107-131)/(49-74) 131/74     Physical Exam:  Constitutional - no acute distress, nontoxic, in bed  HEENT-NCAT, mucous membranes moist  CV-RRR, S1 S2 normal, no m/r/g  Resp-CTAB, no wheezes, rhonchi or rales  Abd-soft, nontender, nondistended, normoactive bowel sounds, overweight  Ext-No lower extremity cyanosis, clubbing or edema bilaterally  Neuro-alert and oriented, speech clear, moves all extremities   Psych-normal affect   Skin- No rash on exposed UE or LE bilaterally      Results Reviewed:  LAB RESULTS:      Lab 22  0350 22  0350 22  1109 22  1928   WBC 9.38 8.57 7.75 9.40   HEMOGLOBIN 13.7 13.8 13.4 14.6   HEMATOCRIT 42.3 41.2 43.6 44.1   PLATELETS 204 197 176 233   NEUTROS ABS 4.29 4.06 4.09 5.38   IMMATURE GRANS (ABS) 0.02 0.02 0.01 0.02   LYMPHS ABS 4.09* 3.57* 2.94 3.26*   MONOS ABS 0.75 0.71 0.58 0.55   EOS ABS 0.18 0.16 0.10 0.15   MCV 89.2 86.0 94.2 86.5         Lab 22  0350 22  0350 22  1109 22  2333 22  2158   SODIUM 139 139 138  --  142   POTASSIUM 3.8 3.8 4.1  --  4.6   CHLORIDE 104 104 107  --  106   CO2 21.0* 21.0* 19.0*  --  25.0   ANION GAP 14.0 14.0 12.0  --  11.0   BUN 14 12 10  --  10   CREATININE 0.67 0.71 0.62  --  0.79   EGFR 103.4 100.6 105.3  --  88.5    GLUCOSE 145* 116* 129*  --  116*   CALCIUM 9.9 10.1 9.7  --  10.6*   IONIZED CALCIUM  --   --   --  1.42*  --    MAGNESIUM  --   --   --   --  2.2   HEMOGLOBIN A1C  --   --   --   --  8.80*   TSH  --   --   --   --  1.640         Lab 03/07/22  0350 03/06/22  0350 03/04/22 1928   TOTAL PROTEIN 6.4 6.6 7.3   ALBUMIN 4.20 4.30 4.90   GLOBULIN 2.2 2.3 2.4   ALT (SGPT) 34* 36* 47*   AST (SGOT) 19 23 33*   BILIRUBIN 0.2 0.4 <0.2   ALK PHOS 89 95 100   LIPASE  --   --  24         Lab 03/04/22 2129 03/04/22 1928   PROBNP  --  8.9   TROPONIN T <0.010 <0.010         Lab 03/05/22  1109   CHOLESTEROL 108   LDL CHOL 50   HDL CHOL 35*   TRIGLYCERIDES 128             Brief Urine Lab Results     None          Microbiology Results Abnormal     Procedure Component Value - Date/Time    COVID PRE-OP / PRE-PROCEDURE SCREENING ORDER (NO ISOLATION) - Swab, Nasopharynx [[id number]]  (Normal) Collected: 03/04/22 2229    Lab Status: Final result Specimen: Swab from Nasopharynx Updated: 03/04/22 2259    Narrative:      The following orders were created for panel order COVID PRE-OP / PRE-PROCEDURE SCREENING ORDER (NO ISOLATION) - Swab, Nasopharynx.  Procedure                               Abnormality         Status                     ---------                               -----------         ------                     COVID-19 and FLU A/B PCR...[[id number]]  Normal              Final result                 Please view results for these tests on the individual orders.    COVID-19 and FLU A/B PCR - Swab, Nasopharynx [[id number]]  (Normal) Collected: 03/04/22 2229    Lab Status: Final result Specimen: Swab from Nasopharynx Updated: 03/04/22 2259     COVID19 Not Detected     Influenza A PCR Not Detected     Influenza B PCR Not Detected    Narrative:      Fact sheet for providers: https://www.fda.gov/media/560598/download    Fact sheet for patients: https://www.fda.gov/media/636695/download    Test performed by PCR.          MRI Brain With &  Without Contrast    Result Date: 3/6/2022  DATE OF EXAM: 3/6/2022 11:52 AM  PROCEDURE: MRI BRAIN W WO CONTRAST-  INDICATIONS: dizziness; R07.89-Other chest pain; R06.02-Shortness of breath; R55-Syncope and collapse; R20.0-Anesthesia of skin; R20.2-Paresthesia of skin; Z86.79-Personal history of other diseases of the circulatory system; Z86.39-Personal history of other endocrine, nutritional and metabolic disease; Z86.39-Personal history of other endocrine, nutritional and metabolic disease; Z86.39-Personal history of ot  COMPARISON: No comparisons available.  TECHNIQUE: Multiplanar multisequence images of the brain were performed prior to and after the uneventful intravenous contrast administration of 20 MultiHance.  FINDINGS: No abnormal diffusion restriction is observed to indicate acute or subacute focal ischemia. Minimal scattered nonspecific white matter signal changes are noted indicating mild age-related changes. Associated diffuse volume loss is noted. There is no evidence for abnormal cerebral edema. There is no evidence for abnormal focal enhancement or abnormal enhancing mass. There is no mass effect or midline shift. The ventricular system is nondilated. The basilar cisterns are patent. The midline structures are unremarkable. No significant extra-axial fluid collections are identified. No significant abnormal signal is observed involving the paranasal sinuses or mastoid air cells.      Impression: 1.  No evidence for acute or subacute focal ischemia. 2.  No evidence for abnormal focal enhancement or abnormal enhancing mass. 3.  Minimal scattered nonspecific white matter signal changes are noted with associated mild diffuse volume loss indicating mild age-related changes.  This report was finalized on 3/6/2022 12:24 PM by Abdon Eli MD.        Results for orders placed during the hospital encounter of 03/04/22    Adult Transthoracic Echo Complete W/ Cont if Necessary Per Protocol    Interpretation  Summary  · Estimated left ventricular EF = 60%  · The cardiac valves are anatomically and functionally normal.      I have reviewed the medications:  Scheduled Meds:atorvastatin, 10 mg, Oral, Daily  cyclobenzaprine, 5 mg, Oral, Nightly  enoxaparin, 40 mg, Subcutaneous, Q24H  insulin lispro, 0-7 Units, Subcutaneous, TID AC  lisinopril, 5 mg, Oral, Daily  sodium chloride, 10 mL, Intravenous, Q12H      Continuous Infusions:   PRN Meds:.•  acetaminophen  •  dextrose  •  dextrose  •  glucagon (human recombinant)  •  melatonin  •  ondansetron **OR** ondansetron  •  sodium chloride  •  sodium chloride    Assessment/Plan   Assessment & Plan     Active Hospital Problems    Diagnosis  POA   • Atypical chest pain [R07.89]  Yes   • Chest pain [R07.9]  Yes   • Elevated liver enzymes [R74.8]  Yes   • Type 2 diabetes mellitus (HCC) [E11.9]  Yes   • Essential hypertension [I10]  Yes   • Hyperlipidemia [E78.5]  Yes   • Hypercalcemia [E83.52]  Yes      Resolved Hospital Problems   No resolved problems to display.        Brief Hospital Course to date:  Nighat Oden is a 56 y.o. female with a history of type 2 diabetes, hypertension, hyperlipidemia, anxiety who presents to [facility] ED with complaint of intermittent chest pain for 3 weeks. Is currently being worked up by, Dr. Roland Nelson at [facility] and just turned in Holter monitor. Had a chemical stress test week before last     This patient's problems and plans were partially entered by my partner and updated as appropriate by me 03/07/22.     Chest Pain  -Patient currently complains of ongoing chest pain with radiation to the neck and dizziness.  - vitals and BP stable.  -CXR unremarkable.  -CTA chest unremarkable  -Troponin negative x2.  -EKG shows no ischemic ST changes.  -Mag normal  -ECHO stable  -Cardiology consulted and following- does not believe this is cardiac  - attempting to obtain cardiac monitor records from [facility]  - Normal stress test at  "[facility] with results reviewed by Dr. Urbina  - normal carotid duplex 3/5/22  - cardiology screening for pheochromocytoma and 5-HIAA  - given in the ED  -Monitor on telemetry  - patient reported palpitations and \"spells\" overnight but telemetry was evaluated by cardiology and there are no rhythm abnormalities.  - Ongoing dizziness with some possible intermittent paresthesia with these episodes. - MRI brain without acute changes     Hypertension  Hyperlipidemia  -Continue home lisinopril  -Continue statin  -Lipid panel within normal limits     Type 2 diabetes mellitus  -Blood glucose 116 on arrival  -HgA1C: 8.8  -Hold home oral meds  -Fingerstick ACHS. SSI     Elevated liver enzymes  -Hepatitis panel negative  - trended down  - possible fatty liver vs secondary to statin, will need surveillance with PCP  -May consider RUQ ultrasound.     Hypercalcemia  -Check ionized calcium  -Check Vit D25 hydroxy, and D 1,25 Dihydroxy  - send PTH  - can follow up with PCP    DVT prophylaxis:  Medical DVT prophylaxis orders are present.       AM-PAC 6 Clicks Score (PT): 22 (03/07/22 0800)    Disposition: I expect the patient to be discharged TBD.    CODE STATUS:   Code Status and Medical Interventions:   Ordered at: 03/05/22 0013     Code Status (Patient has no pulse and is not breathing):    CPR (Attempt to Resuscitate)     Medical Interventions (Patient has pulse or is breathing):    Full Support       Keaton Cross MD  03/07/22              "

## 2022-03-07 NOTE — CASE MANAGEMENT/SOCIAL WORK
Discharge Planning Assessment  [facility]     Patient Name: Nighat Oden  MRN: [id number]  Today's Date: 3/7/2022    Admit Date: 3/4/2022     Discharge Needs Assessment     Row Name 03/07/22 1249       Living Environment    People in Home spouse    Name(s) of People in Home Keith Oden    Current Living Arrangements home    Primary Care Provided by self    Provides Primary Care For no one    Family Caregiver if Needed spouse    Family Caregiver Names Viki Oden    Quality of Family Relationships helpful;involved;supportive    Able to Return to Prior Arrangements yes       Transition Planning    Patient/Family Anticipates Transition to home with family    Transportation Anticipated family or friend will provide       Discharge Needs Assessment    Readmission Within the Last 30 Days no previous admission in last 30 days    Equipment Currently Used at Home cpap    Concerns to be Addressed discharge planning               Discharge Plan     Row Name 03/07/22 1251       Plan    Plan Home with Family    Patient/Family in Agreement with Plan yes    Plan Comments I have met with Mrs. Oden at her bedside today to initiate a discharge plan.  She states that she lives in [facility] [place] with her , Keith.  She reports that she is independent with activities of daily living and mobility.  She denies use of DME with the exception of a CPAP.  She denies current receipt of home health/ outpatient services.  Mrs. Oden plans to return home and states that her  will provide transportation.  She anticipates having no discharge needs.  CM will continue to follow the plan of care and assist with discharge planning as recommendations become available.    Final Discharge Disposition Code 01 - home or self-care              Continued Care and Services - Admitted Since 3/4/2022    Coordination has not been started for this encounter.          Demographic Summary     Row Name 03/07/22 1248       General  Information    General Information Comments I have confirmed with Mrs. Oden her PCP is Fausto Powers MD and her insurance provider is [employer].               Functional Status     Row Name 03/07/22 1249       Functional Status, IADL    Medications independent    Meal Preparation independent    Housekeeping independent    Laundry independent    Shopping independent               Psychosocial    No documentation.                Abuse/Neglect    No documentation.                Legal    No documentation.                Substance Abuse    No documentation.                Patient Forms    No documentation.                   Umu Moreira RN

## 2022-03-08 ENCOUNTER — READMISSION MANAGEMENT (OUTPATIENT)
Dept: CALL CENTER | Facility: HOSPITAL | Age: 57
End: 2022-03-08

## 2022-03-08 VITALS
HEIGHT: 67 IN | OXYGEN SATURATION: 98 % | TEMPERATURE: 98.1 F | HEART RATE: 87 BPM | SYSTOLIC BLOOD PRESSURE: 120 MMHG | WEIGHT: 254.5 LBS | RESPIRATION RATE: 18 BRPM | DIASTOLIC BLOOD PRESSURE: 75 MMHG | BODY MASS INDEX: 39.94 KG/M2

## 2022-03-08 PROBLEM — R07.89 ATYPICAL CHEST PAIN: Status: RESOLVED | Noted: 2022-03-06 | Resolved: 2022-03-08

## 2022-03-08 PROBLEM — R07.9 CHEST PAIN: Status: RESOLVED | Noted: 2022-03-04 | Resolved: 2022-03-08

## 2022-03-08 PROBLEM — R74.8 ELEVATED LIVER ENZYMES: Status: RESOLVED | Noted: 2022-03-04 | Resolved: 2022-03-08

## 2022-03-08 PROBLEM — E83.52 HYPERCALCEMIA: Status: RESOLVED | Noted: 2022-03-04 | Resolved: 2022-03-08

## 2022-03-08 LAB
GLUCOSE BLDC GLUCOMTR-MCNC: 130 MG/DL (ref 70–130)
GLUCOSE BLDC GLUCOMTR-MCNC: 146 MG/DL (ref 70–130)
PTH-INTACT SERPL-MCNC: 45.9 PG/ML (ref 15–65)

## 2022-03-08 PROCEDURE — 99239 HOSP IP/OBS DSCHRG MGMT >30: CPT | Performed by: INTERNAL MEDICINE

## 2022-03-08 PROCEDURE — 97110 THERAPEUTIC EXERCISES: CPT

## 2022-03-08 PROCEDURE — 82962 GLUCOSE BLOOD TEST: CPT

## 2022-03-08 PROCEDURE — 97530 THERAPEUTIC ACTIVITIES: CPT

## 2022-03-08 PROCEDURE — 97116 GAIT TRAINING THERAPY: CPT

## 2022-03-08 PROCEDURE — 97162 PT EVAL MOD COMPLEX 30 MIN: CPT

## 2022-03-08 PROCEDURE — 83970 ASSAY OF PARATHORMONE: CPT | Performed by: INTERNAL MEDICINE

## 2022-03-08 RX ADMIN — Medication 10 ML: at 08:15

## 2022-03-08 NOTE — DISCHARGE SUMMARY
"    [facility] Medicine Services  DISCHARGE SUMMARY    Patient Name: Nighat Oden  : 1965  MRN: [id number]    Date of Admission: 3/4/2022  7:55 PM  Date of Discharge:  3/8/22  Primary Care Physician: Fausto Powers MD    Consults     Date and Time Order Name Status Description    3/5/2022  5:45 AM Inpatient Cardiology Consult Completed           Hospital Course     Presenting Problem:   Chest pain [R07.9]  Atypical chest pain [R07.89]    Active Hospital Problems    Diagnosis  POA   • Type 2 diabetes mellitus (HCC) [E11.9]  Yes   • Essential hypertension [I10]  Yes   • Hyperlipidemia [E78.5]  Yes      Resolved Hospital Problems    Diagnosis Date Resolved POA   • Atypical chest pain [R07.89] 2022 Yes   • Chest pain [R07.9] 2022 Yes   • Elevated liver enzymes [R74.8] 2022 Yes   • Hypercalcemia [E83.52] 2022 Yes          Hospital Course:  Nighat Oden is a 56 y.o. female with a history of type 2 diabetes, hypertension, hyperlipidemia, anxiety who presents to [facility] ED with complaint of intermittent chest pain for 3 weeks. She is currently being worked up by, Dr. Roland Nelson at [facility] and just turned in her Holter monitor. She states she had a cardiac stress test the prior week.         Chest Pain  -Patient seen by Cardiology Dr Urbina  -CXR unremarkable.  -CTA chest unremarkable  -Troponin negative x2.  -EKG showed no ischemic ST changes.  -ECHO 3/5/22 showed and EF of 60% and no structural or valvular abnormalities.  -Cardiology consulted and following- does not believe this is cardiac  - Reportedly Normal stress test at [facility] with results reviewed by Dr. Urbina  - normal carotid duplex 3/5/22  - patient reported palpitations and \"spells\" overnight but telemetry was evaluated by cardiology and there are no rhythm abnormalities.  Attempted to obtain holter monitor report from [facility], however unable to obtain report " with final results  - Ongoing dizziness with some possible intermittent paresthesia with these episodes. - MRI brain brain obtained, but showed no acute changes  - cardiology screening for pheochromocytoma with metanephrines and 5-HIAA -- results pending at time of discharge  - Dr Urbina recommended followup in his clinic in 6 weeks. However when I discussed this recommendation with Ms Oden, she declined and asked that we not schedule this appointment.  She stated she instead intends to followup with Dr Nelson (with appt on 3/9/22) and plans to consolidate her cardiac care at [facility].     Hypertension  Hyperlipidemia  -Continue home lisinopril  -Continue statin  -Lipid panel within normal limits     Type 2 diabetes mellitus  -Blood glucose 116 on arrival  -HgA1C: 8.8     Elevated liver enzymes  - possible fatty liver vs secondary to statin  - discussed elevated liver enzymes with patient, their significance, and need for surveillance with PCP     Hypercalcemia  -calcium level 10.6 at admission  - PTH level normal at 45.9  - Vit D25 hydroxy, and D 1,25 Dihydroxy levels pending at time of discharge  - advised patient to hold off taking further vitamin D supplements for now  - discussed differential diagnoses for hypercalcemia with Ms Oden and the need for PCP followup        Discharge Follow Up Recommendations for outpatient labs/diagnostics:  Suggest CMP and ionized calcium level check in one week and again in one month.  Further workup by PCP for hypercalcemia and elevated liver enzymes as needed.    Metanephrines and 5-HIAA lab results pending at the time of discharge from this facility    Day of Discharge     HPI:   Feels better today, very few palpitations today.  Wants to go home.    Review of Systems  Gen- No fevers, chills  CV- No chest pain, palpitations  Resp- No cough, dyspnea  GI- No N/V/D, abd pain        Vital Signs:   Temp:  [97.7 °F (36.5 °C)-98.2 °F (36.8 °C)] 98.1 °F (36.7 °C)  Heart  Rate:  [65-91] 87  Resp:  [18] 18  BP: (103-121)/(61-82) 120/75      Physical Exam:  Constitutional - no acute distress, nontoxic, sitting up in chair  HEENT-NCAT, mucous membranes moist  CV-RRR, S1 S2 normal, no m/r/g  Resp-CTAB, no wheezes, rhonchi or rales  Abd-soft, nontender, nondistended, normoactive bowel sounds, overweight  Ext-No lower extremity cyanosis, clubbing or edema bilaterally  Neuro-alert and oriented, speech clear, moves all extremities   Psych-normal affect   Skin- No rash on exposed UE or LE bilaterally      Pertinent  and/or Most Recent Results     LAB RESULTS:      Lab 03/07/22  0350 03/06/22  0350 03/05/22  1109 03/04/22 1928   WBC 9.38 8.57 7.75 9.40   HEMOGLOBIN 13.7 13.8 13.4 14.6   HEMATOCRIT 42.3 41.2 43.6 44.1   PLATELETS 204 197 176 233   NEUTROS ABS 4.29 4.06 4.09 5.38   IMMATURE GRANS (ABS) 0.02 0.02 0.01 0.02   LYMPHS ABS 4.09* 3.57* 2.94 3.26*   MONOS ABS 0.75 0.71 0.58 0.55   EOS ABS 0.18 0.16 0.10 0.15   MCV 89.2 86.0 94.2 86.5         Lab 03/07/22  0350 03/06/22  0350 03/05/22  1109 03/04/22  2333 03/04/22 1928   SODIUM 139 139 138  --  142   POTASSIUM 3.8 3.8 4.1  --  4.6   CHLORIDE 104 104 107  --  106   CO2 21.0* 21.0* 19.0*  --  25.0   ANION GAP 14.0 14.0 12.0  --  11.0   BUN 14 12 10  --  10   CREATININE 0.67 0.71 0.62  --  0.79   EGFR 103.4 100.6 105.3  --  88.5   GLUCOSE 145* 116* 129*  --  116*   CALCIUM 9.9 10.1 9.7  --  10.6*   IONIZED CALCIUM  --   --   --  1.42*  --    MAGNESIUM  --   --   --   --  2.2   HEMOGLOBIN A1C  --   --   --   --  8.80*   TSH  --   --   --   --  1.640         Lab 03/07/22  0350 03/06/22  0350 03/04/22 1928   TOTAL PROTEIN 6.4 6.6 7.3   ALBUMIN 4.20 4.30 4.90   GLOBULIN 2.2 2.3 2.4   ALT (SGPT) 34* 36* 47*   AST (SGOT) 19 23 33*   BILIRUBIN 0.2 0.4 <0.2   ALK PHOS 89 95 100   LIPASE  --   --  24         Lab 03/04/22 2129 03/04/22 1928   PROBNP  --  8.9   TROPONIN T <0.010 <0.010         Lab 03/05/22  1109   CHOLESTEROL 108   LDL CHOL 50    HDL CHOL 35*   TRIGLYCERIDES 128             Brief Urine Lab Results     None        Microbiology Results (last 10 days)     Procedure Component Value - Date/Time    COVID PRE-OP / PRE-PROCEDURE SCREENING ORDER (NO ISOLATION) - Swab, Nasopharynx [[id number]]  (Normal) Collected: 03/04/22 2229    Lab Status: Final result Specimen: Swab from Nasopharynx Updated: 03/04/22 2259    Narrative:      The following orders were created for panel order COVID PRE-OP / PRE-PROCEDURE SCREENING ORDER (NO ISOLATION) - Swab, Nasopharynx.  Procedure                               Abnormality         Status                     ---------                               -----------         ------                     COVID-19 and FLU A/B PCR...[[id number]]  Normal              Final result                 Please view results for these tests on the individual orders.    COVID-19 and FLU A/B PCR - Swab, Nasopharynx [[id number]]  (Normal) Collected: 03/04/22 2229    Lab Status: Final result Specimen: Swab from Nasopharynx Updated: 03/04/22 2259     COVID19 Not Detected     Influenza A PCR Not Detected     Influenza B PCR Not Detected    Narrative:      Fact sheet for providers: https://www.fda.gov/media/999576/download    Fact sheet for patients: https://www.fda.gov/media/500245/download    Test performed by PCR.          Adult Transthoracic Echo Complete W/ Cont if Necessary Per Protocol    Result Date: 3/5/2022  · Estimated left ventricular EF = 60% · The cardiac valves are anatomically and functionally normal.      CT Head Without Contrast    Result Date: 3/4/2022  DATE OF EXAM: 3/4/2022 8:52 PM  PROCEDURE: CT HEAD WO CONTRAST-  INDICATIONS: left sided numbness/tingling, r/o acute CVA  COMPARISON: No comparisons available.  TECHNIQUE: Routine transaxial and coronal reconstruction images were obtained through the head without the administration of contrast. Automated exposure control and iterative reconstruction methods were used.  The  radiation dose reduction device was turned on for each scan per the ALARA (As Low as Reasonably Achievable) protocol.  FINDINGS: The calvarium appears intact. Included paranasal sinuses and mastoids appear clear. Soft tissue window images show a mild degree of generalized cerebral atrophy within expected limits for the patient's age. There is no evidence of hemorrhage, contusion, edema, mass, mass effect, infarct, hydrocephalus, or abnormal extra-axial collection.      No evidence of acute intracranial disease.  This report was finalized on 3/4/2022 9:45 PM by Dr. Huang Roque MD.      MRI Brain With & Without Contrast    Result Date: 3/6/2022  DATE OF EXAM: 3/6/2022 11:52 AM  PROCEDURE: MRI BRAIN W WO CONTRAST-  INDICATIONS: dizziness; R07.89-Other chest pain; R06.02-Shortness of breath; R55-Syncope and collapse; R20.0-Anesthesia of skin; R20.2-Paresthesia of skin; Z86.79-Personal history of other diseases of the circulatory system; Z86.39-Personal history of other endocrine, nutritional and metabolic disease; Z86.39-Personal history of other endocrine, nutritional and metabolic disease; Z86.39-Personal history of ot  COMPARISON: No comparisons available.  TECHNIQUE: Multiplanar multisequence images of the brain were performed prior to and after the uneventful intravenous contrast administration of 20 MultiHance.  FINDINGS: No abnormal diffusion restriction is observed to indicate acute or subacute focal ischemia. Minimal scattered nonspecific white matter signal changes are noted indicating mild age-related changes. Associated diffuse volume loss is noted. There is no evidence for abnormal cerebral edema. There is no evidence for abnormal focal enhancement or abnormal enhancing mass. There is no mass effect or midline shift. The ventricular system is nondilated. The basilar cisterns are patent. The midline structures are unremarkable. No significant extra-axial fluid collections are identified. No significant  abnormal signal is observed involving the paranasal sinuses or mastoid air cells.      1.  No evidence for acute or subacute focal ischemia. 2.  No evidence for abnormal focal enhancement or abnormal enhancing mass. 3.  Minimal scattered nonspecific white matter signal changes are noted with associated mild diffuse volume loss indicating mild age-related changes.  This report was finalized on 3/6/2022 12:24 PM by Abdon Eli MD.      XR Chest 1 View    Result Date: 3/4/2022  DATE OF EXAM: 3/4/2022 8:19 PM  PROCEDURE: XR CHEST 1 VW-  INDICATIONS: Chest Pain triage protocol  COMPARISON: No comparisons available.  TECHNIQUE: Single radiographic AP view of the chest was obtained.  FINDINGS: Heart mediastinum and pulmonary vasculature appear within normal limits. Lungs appear normally inflated and grossly clear. Previous cervical spine fusion is noted.      No evidence of active chest disease.  This report was finalized on 3/4/2022 8:56 PM by Dr. Huang Roque MD.      Duplex Carotid Ultrasound CAR    Result Date: 3/5/2022  · Proximal right internal carotid artery is normal. · Proximal left internal carotid artery plaque without significant stenosis.      CT Angiogram Chest    Result Date: 3/4/2022  DATE OF EXAM: 3/4/2022 8:52 PM  PROCEDURE: CT ANGIOGRAM CHEST-  INDICATIONS: left sided CP, SOA, pain radiates to upper back, r/o PE  COMPARISON: No comparisons available.  TECHNIQUE: Contiguous axial imaging was obtained from the thoracic inlet through the upper abdomen following the intravenous administration of 69 mL of Isovue 370. Reconstructed coronal and sagittal images were also obtained. Automated exposure control and iterative reconstruction methods were used.  The radiation dose reduction device was turned on for each scan per the ALARA (As Low as Reasonably Achievable) protocol.  FINDINGS: There is good contrast opacification of the pulmonary arteries. No filling defects are seen to suggest pulmonary embolic  disease. There is no evidence of thoracic aortic aneurysm or dissection, pericardial or pleural effusion, mediastinal, hilar, or axillary adenopathy. No significant coronary artery calcification is seen. Lungs appear clear of active disease. A couple of minute granulomatous calcifications are incidentally noted. There is extensive fatty liver change. Gallbladder surgically absent. No upper abdominal inflammatory change or ascites is seen.       No evidence of pulmonary embolus, pneumonia or other active chest disease.  This report was finalized on 3/4/2022 9:56 PM by Dr. Huang Roque MD.        Results for orders placed during the hospital encounter of 03/04/22    Duplex Carotid Ultrasound CAR    Interpretation Summary  · Proximal right internal carotid artery is normal.  · Proximal left internal carotid artery plaque without significant stenosis.      Results for orders placed during the hospital encounter of 03/04/22    Duplex Carotid Ultrasound CAR    Interpretation Summary  · Proximal right internal carotid artery is normal.  · Proximal left internal carotid artery plaque without significant stenosis.      Results for orders placed during the hospital encounter of 03/04/22    Adult Transthoracic Echo Complete W/ Cont if Necessary Per Protocol    Interpretation Summary  · Estimated left ventricular EF = 60%  · The cardiac valves are anatomically and functionally normal.      Plan for Follow-up of Pending Labs/Results: Dr Urbina to review  Pending Labs     Order Current Status    5 HIAA, Urine, Quantitative, 24 Hour - Urine, Clean Catch In process    Metanephrines, Urine, 24 Hour - Urine, Clean Catch In process        Discharge Details        Discharge Medications      Continue These Medications      Instructions Start Date   Easy Touch Lancets 30G/Twist misc   No dose, route, or frequency recorded.      ONE TOUCH ULTRA MINI w/Device kit   No dose, route, or frequency recorded.         ASK your doctor about these  medications      Instructions Start Date   cyclobenzaprine 5 MG tablet  Commonly known as: FLEXERIL   1 tablet, Oral, Nightly      glimepiride 4 MG tablet  Commonly known as: AMARYL   1 tablet, Oral, Daily      HYDROcodone-acetaminophen 7.5-325 MG per tablet  Commonly known as: NORCO   1 tablet, Oral, As Needed      lisinopril 5 MG tablet  Commonly known as: PRINIVIL,ZESTRIL   1 tablet, Oral, Daily      metFORMIN 500 MG tablet  Commonly known as: GLUCOPHAGE   1 tablet, Oral, 2 Times Daily      ONE TOUCH ULTRA TEST test strip  Generic drug: glucose blood   No dose, route, or frequency recorded.      simvastatin 20 MG tablet  Commonly known as: ZOCOR   1 tablet, Oral, Daily             No Known Allergies      Discharge Disposition:      Diet:  Hospital:  Diet Order   Procedures   • Diet Regular; Cardiac, Consistent Carbohydrate       Activity:      Restrictions or Other Recommendations:         CODE STATUS:    Code Status and Medical Interventions:   Ordered at: 03/05/22 0013     Code Status (Patient has no pulse and is not breathing):    CPR (Attempt to Resuscitate)     Medical Interventions (Patient has pulse or is breathing):    Full Support       No future appointments.              Keaton Cross MD  03/08/22      Time Spent on Discharge:  I spent  40  minutes on this discharge activity which included: face-to-face encounter with the patient, reviewing the data in the system, coordination of the care with the nursing staff as well as consultants, documentation, and entering orders.

## 2022-03-08 NOTE — THERAPY EVALUATION
Patient Name: Nighat Oden  : 1965    MRN: [id number]                              Today's Date: 3/8/2022       Admit Date: 3/4/2022    Visit Dx:     ICD-10-CM ICD-9-CM   1. Atypical chest pain  R07.89 786.59   2. Shortness of breath  R06.02 786.05   3. Near syncope  R55 780.2   4. Numbness and tingling of left upper and lower extremity  R20.0 782.0    R20.2    5. History of hypertension  Z86.79 V12.59   6. History of hyperlipidemia  Z86.39 V12.29   7. History of obesity  Z86.39 V12.29   8. History of diabetes mellitus  Z86.39 V12.29   9. Family history of coronary artery disease  Z82.49 V17.3     Patient Active Problem List   Diagnosis   • Sleep apnea with use of continuous positive airway pressure (CPAP)   • Osteoarthritis   • Obesity   • Menopause   • Family history of breast cancer in mother   • Disc disorder of thoracic region   • Chest pain   • Elevated liver enzymes   • Type 2 diabetes mellitus (HCC)   • Essential hypertension   • Hyperlipidemia   • Hypercalcemia   • Atypical chest pain     Past Medical History:   Diagnosis Date   • Disc disorder of thoracic region     compressed disc T10-T11   • Family history of breast cancer in mother    • Menopause    • Obesity    • Osteoarthritis    • Sleep apnea with use of continuous positive airway pressure (CPAP)      Past Surgical History:   Procedure Laterality Date   • BREAST BIOPSY     • CERVICAL DISCECTOMY ANTERIOR     • CHOLECYSTECTOMY     • SALPINGO OOPHORECTOMY Left    • TOTAL ABDOMINAL HYSTERECTOMY WITH SALPINGO OOPHORECTOMY Right       General Information     Row Name 22 0845          Physical Therapy Time and Intention    Document Type evaluation  -DM     Mode of Treatment physical therapy  -DM     Row Name 22 0845          General Information    Patient Profile Reviewed yes  -DM     Prior Level of Function independent:;all household mobility;community mobility;gait;transfer;bed mobility;ADL's;home management;driving;shopping   normally indep on ramp ent.(occ uses steps);spouse helps w/ HM as needed when pt dizzy past 3 wks  -DM     Existing Precautions/Restrictions other (see comments)  atyp.CP (& dizzy x 3 wks);per cardiol.,non-cardiogen.per test results;check orthost.BP's; elev LFT's,hypercalc.; h/o DDD  -DM     Barriers to Rehab ineffective coping  Anx.  -DM     Row Name 03/08/22 0845          Living Environment    People in Home spouse  -DM     Row Name 03/08/22 0845          Home Main Entrance    Number of Stairs, Main Entrance other (see comments)  ramp at back ent.(primary use); sev.steps@front  -DM     Row Name 03/08/22 0845          Stairs Within Home, Primary    Stairs, Within Home, Primary 1-st.  -DM     Number of Stairs, Within Home, Primary none  -DM     Row Name 03/08/22 0845          Cognition    Orientation Status (Cognition) oriented x 4  -DM     Row Name 03/08/22 0845          Safety Issues, Functional Mobility    Safety Issues Affecting Function (Mobility) safety precaution awareness;safety precautions follow-through/compliance  -DM     Impairments Affecting Function (Mobility) endurance/activity tolerance;strength  -DM           User Key  (r) = Recorded By, (t) = Taken By, (c) = Cosigned By    Initials Name Provider Type    DM Netta Graf, PT Physical Therapist               Mobility     Row Name 03/08/22 0845          Bed Mobility    Bed Mobility rolling right;scooting/bridging;supine-sit  -DM     Rolling Right [place] (Bed Mobility) independent  -DM     Scooting/Bridging [place] (Bed Mobility) modified independence  -DM     Supine-Sit [place] (Bed Mobility) modified independence  -DM     Assistive Device (Bed Mobility) head of bed elevated;bed rails  -DM     Comment, (Bed Mobility) Issued mask,bharati grimm ch.al.per nsg req.;brought addnl chair (for spouse);took orthostat BP's (dizzy at rest,w/ prog incr as pt mobilized);req shoes for gt (min.A to don)  -DM     Row Name 03/08/22 0845           Transfers    Comment, (Transfers) cues for HP,seq  -DM     Row Name 03/08/22 0845          Sit-Stand Transfer    Sit-Stand Wal[place] (Transfers) verbal cues;contact guard  -DM     Assistive Device (Sit-Stand Transfers) other (see comments)  Decl. AD;R bedrail;armrests w/ stand to sit d/t dizzy  -DM     Comment, (Sit-Stand Transfer) took BP sit,stand;PLB while focusing ahead in mirror, d/t sl. incr dizziness;,BP stable  -DM     Row Name 03/08/22 0845          Gait/Stairs (Locomotion)    Waldo Level (Gait) contact guard  2 brief stand.rests d/t incr dizziness;decl. bhatt chair;;repeated orthostat BP's stand & sit.EOB (stable); HR decr to 109; amb 10 ft to recliner;cardiol.APRN assessed;pt recounting episodes@night of signif dizziness,fluttering & shooting CP 3-4/10  -DM     Distance in Feet (Gait) 360 (350 + 10)  -DM     Deviations/Abnormal Patterns (Gait) bilateral deviations;tegan decreased;stride length decreased  decr step length  -DM     Bilateral Gait Deviations heel strike decreased  -DM     Comment, (Gait/Stairs) cues to incr step length, focus ahead to minimize dizziness, & PLB; stable orthostat BP's;req.addnl.pillow to supp.head (done)  -DM           User Key  (r) = Recorded By, (t) = Taken By, (c) = Cosigned By    Initials Name Provider Type    DM Netta Graf, PT Physical Therapist               Obj/Interventions     Row Name 03/08/22 0845          Range of Motion Comprehensive    Comment, General Range of Motion B hips matamoros. 15-20 % d/t tissue approx/thigh girth  -DM     Row Name 03/08/22 0845          Strength Comprehensive (MMT)    General Manual Muscle Testing (MMT) Assessment lower extremity strength deficits identified  -DM     Comment, General Manual Muscle Testing (MMT) Assessment B hips grossly 3- to 4/5  -DM     Row Name 03/08/22 0845          Motor Skills    Therapeutic Exercise shoulder;hip;knee;ankle  issued HEP & instructed  -DM     Row Name 03/08/22 0845           Shoulder (Therapeutic Exercise)    Shoulder (Therapeutic Exercise) AROM (active range of motion)  -DM     Shoulder AROM (Therapeutic Exercise) bilateral;flexion;extension;aBduction;aDduction;sitting;10 repetitions;other (see comments)  biceps curls  -DM     Row Name 03/08/22 0845          Hip (Therapeutic Exercise)    Hip (Therapeutic Exercise) AROM (active range of motion);isometric exercises  -DM     Hip AROM (Therapeutic Exercise) bilateral;flexion;extension;aBduction;aDduction;external rotation;internal rotation;sitting;10 repetitions;other (see comments);supine  sitting karla, bridging x 1  -DM     Hip Isometrics (Therapeutic Exercise) bilateral;gluteal sets;10 repetitions;sitting  -DM     Row Name 03/08/22 0845          Knee (Therapeutic Exercise)    Knee (Therapeutic Exercise) AROM (active range of motion);isometric exercises  -DM     Knee AROM (Therapeutic Exercise) bilateral;flexion;extension;SAQ (short arc quad);LAQ (long arc quad);heel slides;sitting;10 repetitions  -DM     Knee Isometrics (Therapeutic Exercise) bilateral;hamstring sets;quad sets;sitting;10 repetitions  -DM     Row Name 03/08/22 0845          Ankle (Therapeutic Exercise)    Ankle (Therapeutic Exercise) AROM (active range of motion)  -DM     Ankle AROM (Therapeutic Exercise) bilateral;dorsiflexion;plantarflexion;sitting;10 repetitions;other (see comments)  AC,rocking h.to toe  -DM     Row Name 03/08/22 0845          Balance    Balance Assessment sitting static balance;sitting dynamic balance;standing static balance;standing dynamic balance  -DM     Static Sitting Balance independent  -DM     Dynamic Sitting Balance independent;verbal cues  recip scooting;reaching out of LENO for objects on tray table  -DM     Position, Sitting Balance unsupported;sitting edge of bed  -DM     Static Standing Balance verbal cues;contact guard  sl. dizzy; BP taken while facing mirror & PLB  -DM     Dynamic Standing Balance verbal cues;minimal assist  min HHA  to init sidestepping/backing to recliner  -DM     Position/Device Used, Standing Balance supported  -DM     Balance Interventions sitting;standing;static;dynamic;weight shifting activity  -DM           User Key  (r) = Recorded By, (t) = Taken By, (c) = Cosigned By    Initials Name Provider Type    Netta Gupta, PT Physical Therapist               Goals/Plan     Row Name 03/08/22 0845          Bed Mobility Goal 1 (PT)    Activity/Assistive Device (Bed Mobility Goal 1, PT) bed mobility activities, all  -DM     [place] Level/Cues Needed (Bed Mobility Goal 1, PT) independent  -DM     Time Frame (Bed Mobility Goal 1, PT) long term goal (LTG);5 days  -DM     Row Name 03/08/22 0845          Transfer Goal 1 (PT)    Activity/Assistive Device (Transfer Goal 1, PT) sit-to-stand/stand-to-sit;bed-to-chair/chair-to-bed  -DM     [place] Level/Cues Needed (Transfer Goal 1, PT) independent  -DM     Time Frame (Transfer Goal 1, PT) long term goal (LTG);5 days  -DM     Row Name 03/08/22 0845          Gait Training Goal 1 (PT)    Activity/Assistive Device (Gait Training Goal 1, PT) gait (walking locomotion)  stable VS; No dizziness or rests; o2 > 92%  -DM     [place] Level (Gait Training Goal 1, PT) independent  -DM     Distance (Gait Training Goal 1, PT) 600  -DM     Time Frame (Gait Training Goal 1, PT) long term goal (LTG);5 days  -DM     Row Name 03/08/22 0845          Stairs Goal 1 (PT)    Activity/Assistive Device (Stairs Goal 1, PT) ascending stairs;descending stairs;using handrail, right  stable VS;No dizziness  -DM     [place] Level/Cues Needed (Stairs Goal 1, PT) supervision required  -DM     Number of Stairs (Stairs Goal 1, PT) 5  -DM     Time Frame (Stairs Goal 1, PT) long term goal (LTG);5 days  -DM     Row Name 03/08/22 0845          Patient Education Goal (PT)    Activity (Patient Education Goal, PT) HEP exer  -DM     [place]/Cues/Accuracy (Memory Goal 2, PT) demonstrates  adequately;verbalizes understanding  -DM     Time Frame (Patient Education Goal, PT) short term goal (STG);1 day  -DM     Progress/Outcome (Patient Education Goal, PT) goal met  -DM           User Key  (r) = Recorded By, (t) = Taken By, (c) = Cosigned By    Initials Name Provider Type    Netta Gupta, PT Physical Therapist               Clinical Impression     Row Name 03/08/22 0845          Pain    Pretreatment Pain Rating 0/10 - no pain  -DM     Posttreatment Pain Rating 0/10 - no pain  -DM     Row Name 03/08/22 0845          Plan of Care Review    Plan of Care Reviewed With patient;spouse  -DM     Progress improving  -DM     Outcome Evaluation PT eval completed. Presents w/ atyp. CP (per cardiol., tests reveal non-cardiogen.), w/ episodes@ night of shooting pain/signif. fluttering & dizziness, as well as decr LE strength/endurance & impaired funct mobil. STS w/ CGA (orthostat BP's taken),amb 350 ft w/ CGA & 2 stand.rests + 10 ft (min HHA sidesteps to chair), + ther exer per issued HEP.Noted incr dizziness w/ gt, HR to 111, desat 95% on RA, & stable BP. Plans home w/ spouse's assist @ d/c (will not req f/u PT).  -DM     Row Name 03/08/22 0845          Therapy Assessment/Plan (PT)    Patient/Family Therapy Goals Statement (PT) improved funct mobil  -DM     Rehab Potential (PT) good, to achieve stated therapy goals  -DM     Criteria for Skilled Interventions Met (PT) yes;meets criteria;skilled treatment is necessary  -DM     Row Name 03/08/22 0845          Vital Signs    Pre Systolic BP Rehab 114  -DM     Pre Treatment Diastolic BP 75  -DM     Intra Systolic BP Rehab 126  -DM     Intra Treatment Diastolic BP 77  -DM     Post Systolic BP Rehab 121  -DM     Post Treatment Diastolic BP 79  -DM     Pretreatment Heart Rate (beats/min) 85  -DM     Intratreatment Heart Rate (beats/min) 111  -DM     Posttreatment Heart Rate (beats/min) 99  -DM     Pre SpO2 (%) 98  -DM     O2 Delivery Pre Treatment room air  -DM      Intra SpO2 (%) 95  -DM     O2 Delivery Intra Treatment room air  -DM     Post SpO2 (%) 96  -DM     O2 Delivery Post Treatment room air  -DM     Pre Patient Position Supine  -DM     Intra Patient Position Standing  -DM     Post Patient Position Sitting  -DM     Rest Breaks  3  2 stand. + 1 sit  -DM     Row Name 03/08/22 0845          Positioning and Restraints    Pre-Treatment Position in bed  -DM     Post Treatment Position chair  -DM     In Chair notified nsg;reclined;call light within reach;encouraged to call for assist;with family/caregiver;waffle cushion;legs elevated  req. new pitcher ice H20 (done);chair al.placed per nsg, but spouse will be present during day & nsg stated alarm can be left disconn.  -DM           User Key  (r) = Recorded By, (t) = Taken By, (c) = Cosigned By    Initials Name Provider Type    Netta Gupta, PT Physical Therapist               Outcome Measures     Row Name 03/08/22 0845 03/08/22 0750       How much help from another person do you currently need...    Turning from your back to your side while in flat bed without using bedrails? 4  -DM 4  -AM    Moving from lying on back to sitting on the side of a flat bed without bedrails? 4  -DM 4  -AM    Moving to and from a bed to a chair (including a wheelchair)? 3  -DM 4  -AM    Standing up from a chair using your arms (e.g., wheelchair, bedside chair)? 3  -DM 4  -AM    Climbing 3-5 steps with a railing? 3  -DM 4  -AM    To walk in hospital room? 3  -DM 4  -AM    AM-PAC 6 Clicks Score (PT) 20  -DM 24  -AM    Row Name 03/08/22 0845          Functional Assessment    Outcome Measure Options AM-PAC 6 Clicks Basic Mobility (PT)  -DM           User Key  (r) = Recorded By, (t) = Taken By, (c) = Cosigned By    Initials Name Provider Type    Netta Gupta, PT Physical Therapist    Alcides Rojas, RN Registered Nurse                             Physical Therapy Education                 Title: PT OT SLP Therapies (Done)     Topic:  Physical Therapy (Done)     Point: Mobility training (Done)     Learning Progress Summary           Patient Eager, E,D,H, DU,VU by DM at 3/8/2022 1051   Significant Other Eager, E,D,H, DU,VU by DM at 3/8/2022 1051                   Point: Home exercise program (Done)     Learning Progress Summary           Patient Eager, E,D,H, DU,VU by DM at 3/8/2022 1051   Significant Other Eager, E,D,H, DU,VU by DM at 3/8/2022 1051                   Point: Body mechanics (Done)     Learning Progress Summary           Patient Eager, E,D,H, DU,VU by DM at 3/8/2022 1051   Significant Other Eager, E,D,H, DU,VU by DM at 3/8/2022 1051                   Point: Precautions (Done)     Learning Progress Summary           Patient Eager, E,D,H, DU,VU by DM at 3/8/2022 1051   Significant Other Eager, E,D,H, DU,VU by DM at 3/8/2022 1051                               User Key     Initials Effective Dates Name Provider Type Discipline    DM 06/16/21 -  Netta Graf, PT Physical Therapist PT              PT Recommendation and Plan  Planned Therapy Interventions (PT): bed mobility training, gait training, home exercise program, patient/family education, stair training, strengthening, transfer training  Plan of Care Reviewed With: patient, spouse  Progress: improving  Outcome Evaluation: PT eval completed. Presents w/ atyp. CP (per cardiol., tests reveal non-cardiogen.), w/ episodes@ night of shooting pain/signif. fluttering & dizziness, as well as decr LE strength/endurance & impaired funct mobil. STS w/ CGA (orthostat BP's taken),amb 350 ft w/ CGA & 2 stand.rests + 10 ft (min HHA sidesteps to chair), + ther exer per issued HEP.Noted incr dizziness w/ gt, HR to 111, desat 95% on RA, & stable BP. Plans home w/ spouse's assist @ d/c (will not req f/u PT).     Time Calculation:    PT Charges     Row Name 03/08/22 1051             Time Calculation    Start Time 0845  -DM      PT Received On 03/08/22  -DM      PT Goal Re-Cert Due Date 03/18/22  -DM               Time Calculation- PT    Total Timed Code Minutes-  minute(s)  -DM              Timed Charges    76617 - PT Therapeutic Exercise Minutes 17  -DM      89898 - Gait Training Minutes  14  -DM      52161 - PT Therapeutic Activity Minutes 10  -DM              Untimed Charges    PT Eval/Re-eval Minutes 60  -DM              Total Minutes    Timed Charges Total Minutes 41  -DM      Untimed Charges Total Minutes 60  -DM       Total Minutes 101  -DM            User Key  (r) = Recorded By, (t) = Taken By, (c) = Cosigned By    Initials Name Provider Type    Netta Gupta, PT Physical Therapist              Therapy Charges for Today     Code Description Service Date Service Provider Modifiers Qty    60861387799 HC PT THER PROC EA 15 MIN 3/8/2022 Netta Graf, PT GP 1    48889282751 HC GAIT TRAINING EA 15 MIN 3/8/2022 Netta Graf, PT GP 1    83098676184 HC PT THERAPEUTIC ACT EA 15 MIN 3/8/2022 Netta Graf, PT GP 1    82179632659 HC PT EVAL MOD COMPLEXITY 4 3/8/2022 Netta Graf, PT GP 1          PT G-Codes  Outcome Measure Options: AM-PAC 6 Clicks Basic Mobility (PT)  AM-PAC 6 Clicks Score (PT): 20    Netta Graf, PT  3/8/2022

## 2022-03-08 NOTE — PLAN OF CARE
Goal Outcome Evaluation: No c/o chest pain voiced from patient at the time of discharge.

## 2022-03-08 NOTE — NURSING NOTE
I spoke with Ms. Oden and her .  They had concerns about the care she received from Cardiology.  The experience with the NP on 3/8/22 was not pleasant or informative according to the patient.  She is asking to be discharged so that she can see a cardiologist outpatient.  I assisted the patient in loading her my chart yeni on her phone.  I provided people and contact numbers for them to follow their concerns up with.  They are  pleasant people and have had a wonderful experience her at [facility].      Anitra St  [facility] Director

## 2022-03-08 NOTE — PROGRESS NOTES
"[facility] Cardiology Daily Note       LOS: 2 days   Patient Care Team:  Fausto Powers MD as PCP - General (Internal Medicine)  Tristan Yi MD (Inactive) as Consulting Physician (Gynecology)    Chief Complaint:  Palpitations, dizziness, chest pain    Subjective     Subjective: Stated that she feels better but has had a \"few episodes\" of dizziness through the not.  Currently not dizzy no complaining of chest fluttering.      Review of Systems:   As above.    Medications:  atorvastatin, 10 mg, Oral, Daily  cyclobenzaprine, 5 mg, Oral, Nightly  enoxaparin, 40 mg, Subcutaneous, Q24H  insulin lispro, 0-7 Units, Subcutaneous, TID AC  lisinopril, 5 mg, Oral, Daily  sodium chloride, 10 mL, Intravenous, Q12H        Objective     Vital Sign Min/Max for last 24 hours  Temp  Min: 97.7 °F (36.5 °C)  Max: 98.2 °F (36.8 °C)    BP  Min: 103/73  Max: 121/82   Pulse  Min: 65  Max: 91   Resp  Min: 18  Max: 18   SpO2  Min: 94 %  Max: 98 %   No data recorded   No data recorded      Intake/Output Summary (Last 24 hours) at 3/8/2022 0938  Last data filed at 3/7/2022 1700  Gross per 24 hour   Intake 1080 ml   Output 600 ml   Net 480 ml        Flowsheet Rows    Flowsheet Row First Filed Value   Admission Height 170.2 cm (67\") Documented at 03/04/2022 1914   Admission Weight 113 kg (250 lb) Documented at 03/04/2022 1914          Physical Exam:    General: Alert and oriented.   Cardiovascular: Heart has a nondisplaced focal PMI. Regular rate and rhythm without murmur, gallop or rub.  Lungs: Clear without rales or wheezes. Equal expansion is noted.   Abdomen: Soft, nontender.  Extremities: Show no edema.   Skin: warm and dry.     Results Review:    I reviewed the patient's new clinical results.    Tele: Sinus rhythm    Labs:    Results from last 7 days   Lab Units 03/07/22  0350 03/06/22  0350 03/05/22  1109 03/04/22  1928   SODIUM mmol/L 139 139 138 142   POTASSIUM mmol/L 3.8 3.8 4.1 4.6   CHLORIDE mmol/L 104 " 104 107 106   CO2 mmol/L 21.0* 21.0* 19.0* 25.0   BUN mg/dL 14 12 10 10   CREATININE mg/dL 0.67 0.71 0.62 0.79   CALCIUM mg/dL 9.9 10.1 9.7 10.6*   BILIRUBIN mg/dL 0.2 0.4  --  <0.2   ALK PHOS U/L 89 95  --  100   ALT (SGPT) U/L 34* 36*  --  47*   AST (SGOT) U/L 19 23  --  33*   GLUCOSE mg/dL 145* 116* 129* 116*     Results from last 7 days   Lab Units 03/07/22  0350 03/06/22  0350 03/05/22  1109   WBC 10*3/mm3 9.38 8.57 7.75   HEMOGLOBIN g/dL 13.7 13.8 13.4   HEMATOCRIT % 42.3 41.2 43.6   PLATELETS 10*3/mm3 204 197 176     Lab Results   Component Value Date    TROPONINT <0.010 03/04/2022    TROPONINT <0.010 03/04/2022     Lipid Panel    Lipid Panel 3/5/22   Total Cholesterol 108   Triglycerides 128   HDL Cholesterol 35 (A)   VLDL Cholesterol 23   LDL Cholesterol  50   LDL/HDL Ratio 1.35   (A) Abnormal value             No results found for: INR, PROTIME  Stress test 2/25/22 performed at [facility] -  There are no  significant ST segment changes throughout the injection or recovery period.   The patient tolerated the procedure well   Lexiscan Nuclear Stress Test   Rest Images   There is homogenous tracer uptake in all walls   Stress Images   There is homogenous tracer uptake in all walls   There is no evidence of ischemia or infarct.   Left Ventricle   The LV EF is calculated at 65%       Assessment   Assessment:          Chest pain    Elevated liver enzymes    Type 2 diabetes mellitus (HCC)    Essential hypertension    Hyperlipidemia    Hypercalcemia    Atypical chest pain        1.  Atypical chest pain normal ischemic evaluation.  Normal LVEF.  Noncardiac in nature  2.  Recurrent dizziness.  She has persistent symptoms despite normal blood pressure and heart rate.  Suspect this is noncardiac.  She had a prolonged event recorder as an outpatient results which we do not have.  Was done at [facility].  Stress test performed at [facility] showed no evidence of ischemia or infarct  3.   Reported atrial flutter, have reviewed all strips from last evening, there is no evidence of this.  Northera any elevated heart rate to explain symptoms  4.  Labile hypertension.  Currently controlled    Plan:    · So far no cardiovascular testing abnormalities explain her symptoms.  May be neurologic and/or stress related  · With screening for pheochromocytoma and 5-HIAA testing still pending  · Still pending results from event recorder from [facility].  · Follow up with Dr. Urbina in 6 weeks or sooner if needed  · Will follow up PRN as there is no cardiac cause for this symptoms noted at this time      Electronically signed by FORREST Shore, 03/08/22, 9:38 AM EST.

## 2022-03-08 NOTE — PLAN OF CARE
Goal Outcome Evaluation:  Plan of Care Reviewed With: patient, spouse        Progress: improving  Outcome Evaluation: PT eval completed. Presents w/ atyp. CP (per cardiol., tests reveal non-cardiogen.), w/ episodes@ night of shooting pain/signif. fluttering & dizziness, as well as decr LE strength/endurance & impaired funct mobil. STS w/ CGA (orthostat BP's taken),amb 350 ft w/ CGA & 2 stand.rests + 10 ft (min HHA sidesteps to chair), + ther exer per issued HEP.Noted incr dizziness w/ gt, HR to 111, desat 95% on RA, & stable BP. Plans home w/ spouse's assist @ d/c (will not req f/u PT).

## 2022-03-09 NOTE — OUTREACH NOTE
Prep Survey    Flowsheet Row Responses   Religious facility patient discharged from? [facility]   Is LACE score < 7 ? No   Emergency Room discharge w/ pulse ox? No   Eligibility Readm Mgmt   Discharge diagnosis Chest Pain  Hypertension     Does the patient have one of the following disease processes/diagnoses(primary or secondary)? Other   Does the patient have Home health ordered? No   Is there a DME ordered? No   Prep survey completed? Yes          WINSOME LOPES - Registered Nurse

## 2022-03-11 ENCOUNTER — READMISSION MANAGEMENT (OUTPATIENT)
Dept: CALL CENTER | Facility: HOSPITAL | Age: 57
End: 2022-03-11

## 2022-03-11 LAB
METANEPH 24H UR-MRATE: 88 UG/24 HR (ref 36–209)
METANEPHS 24H UR-MCNC: 41 UG/L
NORMETANEPHRINE 24H UR-MCNC: 169 UG/L
NORMETANEPHRINE 24H UR-MRATE: 363 UG/24 HR (ref 131–612)

## 2022-03-11 NOTE — OUTREACH NOTE
"Medical Week 1 Survey    Flowsheet Row Responses   [facility] patient discharged from? [facility]   Does the patient have one of the following disease processes/diagnoses(primary or secondary)? Other   Week 1 attempt successful? Yes   Call start time 0951   Call end time 1000   Discharge diagnosis Chest Pain  Hypertension     Meds reviewed with patient/caregiver? Yes   Is the patient having any side effects they believe may be caused by any medication additions or changes? No   Does the patient have all medications ordered at discharge? N/A   Prescription comments REviewed to ensure patient is no longer taking Vitamin D supplement per MD notes   Is the patient taking all medications as directed (includes completed medication regime)? Yes   Medication comments No new medications added-   Comments regarding appointments Patient completed cardiology f/u on 3/9/22   Does the patient have a primary care provider?  Yes   Does the patient have an appointment with their PCP within 7 days of discharge? No   Comments regarding PCP Encouraged to call today to make hospital f/u--discussed recommendation for labs at one week and to repeat at one month   Nursing Interventions Advised patient to make appointment   Has the patient kept scheduled appointments due by today? Yes   Has home health visited the patient within 72 hours of discharge? N/A   Psychosocial issues? No   Did the patient receive a copy of their discharge instructions? Yes   Nursing interventions Reviewed instructions with patient   What is the patient's perception of their health status since discharge? Same  [ reports patient has had a few episodes of dizziness, \"heart flutter\" and mild chest pain since discharge. No parasthesia noted.  He is monitoring patient closely, checking her BP and her cardiology f/u has been completed. ]   Is the patient/caregiver able to teach back signs and symptoms related to disease process for when to call PCP? Yes   Is " the patient/caregiver able to teach back signs and symptoms related to disease process for when to call 911? Yes   Additional teach back comments REviewed cardiac related s/s and need to seek care urgently--- is aware to return prn.  is still awaiting results from Holter monitor.    Week 1 call completed? Yes          LINDA KEYES - Registered Nurse

## 2022-03-15 ENCOUNTER — TELEPHONE (OUTPATIENT)
Dept: CARDIOLOGY | Facility: CLINIC | Age: 57
End: 2022-03-15

## 2022-03-15 NOTE — TELEPHONE ENCOUNTER
Left patient VM advising of below    ----- Message from Fabien Urbina MD sent at 3/15/2022  8:45 AM EDT -----  Normal test results letter

## 2022-03-21 LAB
5OH-INDOLEACETATE 24H UR-MCNC: 3.2 MG/L
5OH-INDOLEACETATE 24H UR-MRATE: 6.9 MG/24 HR (ref 0–14.9)

## 2022-03-23 ENCOUNTER — READMISSION MANAGEMENT (OUTPATIENT)
Dept: CALL CENTER | Facility: HOSPITAL | Age: 57
End: 2022-03-23

## 2022-03-23 NOTE — OUTREACH NOTE
Medical Week 2 Survey    Flowsheet Row Responses   [facility] [place] patient discharged from? [facility]   Does the patient have one of the following disease processes/diagnoses(primary or secondary)? Other   Week 2 attempt successful? Yes   Call start time 1331   Discharge diagnosis Chest Pain  Hypertension     Rescheduled Revoked   Revoke Decline to participate  [Patient's spouse said he was eating and hung phone up.]   Call end time 1332   Meds reviewed with patient/caregiver? Yes   Is the patient having any side effects they believe may be caused by any medication additions or changes? No   Does the patient have all medications ordered at discharge? N/A   Is the patient taking all medications as directed (includes completed medication regime)? Yes   Comments regarding appointments Patient completed cardiology f/u on 3/9/22   Does the patient have a primary care provider?  Yes          VIDYA LOPES - Registered Nurse

## 2023-03-31 ENCOUNTER — APPOINTMENT (OUTPATIENT)
Dept: CT IMAGING | Facility: HOSPITAL | Age: 58
End: 2023-03-31
Payer: MEDICARE

## 2023-03-31 ENCOUNTER — HOSPITAL ENCOUNTER (EMERGENCY)
Facility: HOSPITAL | Age: 58
Discharge: HOME | End: 2023-03-31
Attending: EMERGENCY MEDICINE | Admitting: EMERGENCY MEDICINE
Payer: MEDICARE

## 2023-03-31 VITALS
TEMPERATURE: 97.8 F | DIASTOLIC BLOOD PRESSURE: 86 MMHG | HEART RATE: 81 BPM | BODY MASS INDEX: 35.63 KG/M2 | OXYGEN SATURATION: 98 % | SYSTOLIC BLOOD PRESSURE: 143 MMHG | RESPIRATION RATE: 16 BRPM | WEIGHT: 227 LBS | HEIGHT: 67 IN

## 2023-03-31 DIAGNOSIS — R10.84 DIFFUSE ABDOMINAL PAIN: Primary | ICD-10-CM

## 2023-03-31 DIAGNOSIS — N28.1 RENAL CYST, RIGHT: ICD-10-CM

## 2023-03-31 DIAGNOSIS — R14.0 BLOATING: ICD-10-CM

## 2023-03-31 LAB
ALBUMIN SERPL-MCNC: 5.1 G/DL (ref 3.5–5.2)
ALBUMIN/GLOB SERPL: 2.1 G/DL
ALP SERPL-CCNC: 121 U/L (ref 39–117)
ALT SERPL W P-5'-P-CCNC: 55 U/L (ref 1–33)
ANION GAP SERPL CALCULATED.3IONS-SCNC: 14 MMOL/L (ref 5–15)
AST SERPL-CCNC: 29 U/L (ref 1–32)
BASOPHILS # BLD AUTO: 0.05 10*3/MM3 (ref 0–0.2)
BASOPHILS NFR BLD AUTO: 0.5 % (ref 0–1.5)
BILIRUB SERPL-MCNC: 0.3 MG/DL (ref 0–1.2)
BILIRUB UR QL STRIP: NEGATIVE
BUN SERPL-MCNC: 9 MG/DL (ref 6–20)
BUN/CREAT SERPL: 11.1 (ref 7–25)
CALCIUM SPEC-SCNC: 11.6 MG/DL (ref 8.6–10.5)
CHLORIDE SERPL-SCNC: 101 MMOL/L (ref 98–107)
CLARITY UR: CLEAR
CO2 SERPL-SCNC: 25 MMOL/L (ref 22–29)
COLOR UR: YELLOW
CREAT SERPL-MCNC: 0.81 MG/DL (ref 0.57–1)
D-LACTATE SERPL-SCNC: 1.5 MMOL/L (ref 0.5–2)
DEPRECATED RDW RBC AUTO: 41 FL (ref 37–54)
EGFRCR SERPLBLD CKD-EPI 2021: 85.3 ML/MIN/1.73
EOSINOPHIL # BLD AUTO: 0.07 10*3/MM3 (ref 0–0.4)
EOSINOPHIL NFR BLD AUTO: 0.6 % (ref 0.3–6.2)
ERYTHROCYTE [DISTWIDTH] IN BLOOD BY AUTOMATED COUNT: 13.2 % (ref 12.3–15.4)
GLOBULIN UR ELPH-MCNC: 2.4 GM/DL
GLUCOSE SERPL-MCNC: 137 MG/DL (ref 65–99)
GLUCOSE UR STRIP-MCNC: NEGATIVE MG/DL
HCT VFR BLD AUTO: 45.8 % (ref 34–46.6)
HGB BLD-MCNC: 15.3 G/DL (ref 12–15.9)
HGB UR QL STRIP.AUTO: NEGATIVE
HOLD SPECIMEN: NORMAL
IMM GRANULOCYTES # BLD AUTO: 0.03 10*3/MM3 (ref 0–0.05)
IMM GRANULOCYTES NFR BLD AUTO: 0.3 % (ref 0–0.5)
KETONES UR QL STRIP: ABNORMAL
LEUKOCYTE ESTERASE UR QL STRIP.AUTO: NEGATIVE
LIPASE SERPL-CCNC: 26 U/L (ref 13–60)
LYMPHOCYTES # BLD AUTO: 3.88 10*3/MM3 (ref 0.7–3.1)
LYMPHOCYTES NFR BLD AUTO: 35.2 % (ref 19.6–45.3)
MCH RBC QN AUTO: 28.4 PG (ref 26.6–33)
MCHC RBC AUTO-ENTMCNC: 33.4 G/DL (ref 31.5–35.7)
MCV RBC AUTO: 85 FL (ref 79–97)
MONOCYTES # BLD AUTO: 0.79 10*3/MM3 (ref 0.1–0.9)
MONOCYTES NFR BLD AUTO: 7.2 % (ref 5–12)
NEUTROPHILS NFR BLD AUTO: 56.2 % (ref 42.7–76)
NEUTROPHILS NFR BLD AUTO: 6.21 10*3/MM3 (ref 1.7–7)
NITRITE UR QL STRIP: NEGATIVE
NRBC BLD AUTO-RTO: 0 /100 WBC (ref 0–0.2)
PH UR STRIP.AUTO: 5.5 [PH] (ref 5–8)
PLATELET # BLD AUTO: 252 10*3/MM3 (ref 140–450)
PMV BLD AUTO: 8.7 FL (ref 6–12)
POTASSIUM SERPL-SCNC: 3.6 MMOL/L (ref 3.5–5.2)
PROT SERPL-MCNC: 7.5 G/DL (ref 6–8.5)
PROT UR QL STRIP: NEGATIVE
RBC # BLD AUTO: 5.39 10*6/MM3 (ref 3.77–5.28)
SODIUM SERPL-SCNC: 140 MMOL/L (ref 136–145)
SP GR UR STRIP: 1.02 (ref 1–1.03)
UROBILINOGEN UR QL STRIP: ABNORMAL
WBC NRBC COR # BLD: 11.03 10*3/MM3 (ref 3.4–10.8)
WHOLE BLOOD HOLD COAG: NORMAL
WHOLE BLOOD HOLD SPECIMEN: NORMAL

## 2023-03-31 PROCEDURE — 99283 EMERGENCY DEPT VISIT LOW MDM: CPT

## 2023-03-31 PROCEDURE — 74176 CT ABD & PELVIS W/O CONTRAST: CPT

## 2023-03-31 PROCEDURE — 81003 URINALYSIS AUTO W/O SCOPE: CPT

## 2023-03-31 PROCEDURE — 83690 ASSAY OF LIPASE: CPT

## 2023-03-31 PROCEDURE — 80053 COMPREHEN METABOLIC PANEL: CPT

## 2023-03-31 PROCEDURE — 85025 COMPLETE CBC W/AUTO DIFF WBC: CPT

## 2023-03-31 PROCEDURE — 83605 ASSAY OF LACTIC ACID: CPT

## 2023-03-31 RX ORDER — PANTOPRAZOLE SODIUM 20 MG/1
20 TABLET, DELAYED RELEASE ORAL DAILY
Qty: 30 TABLET | Refills: 0 | Status: SHIPPED | OUTPATIENT
Start: 2023-03-31

## 2023-03-31 RX ORDER — SODIUM CHLORIDE 9 MG/ML
10 INJECTION INTRAVENOUS AS NEEDED
Status: DISCONTINUED | OUTPATIENT
Start: 2023-03-31 | End: 2023-03-31 | Stop reason: HOSPADM

## 2023-03-31 RX ORDER — PANTOPRAZOLE SODIUM 40 MG/1
40 TABLET, DELAYED RELEASE ORAL ONCE
Status: COMPLETED | OUTPATIENT
Start: 2023-03-31 | End: 2023-03-31

## 2023-03-31 RX ADMIN — PANTOPRAZOLE SODIUM 40 MG: 40 TABLET, DELAYED RELEASE ORAL at 06:31

## 2023-03-31 NOTE — ED PROVIDER NOTES
Subjective   History of Present Illness  Mrs. Oden presents with several weeks of worsening bloating and burning in her abdomen.  It is worse when she lays down at night.  She has been moving her bowels normally and has not had any vomiting.  She denies fevers or chills.  She has taken Tums and Gas-X to no avail.  She has had prior cholecystectomy, cervical discectomy hysterectomy with BSO.  Review of her records indicates she saw her primary care provider a week ago.  A gastric emptying study was ordered.  She has an appointment to see gastroenterology later this month.        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   • Family history of breast cancer in mother    • Menopause    • Obesity    • Osteoarthritis    • Sleep apnea with use of continuous positive airway pressure (CPAP)        No Known Allergies    Past Surgical History:   Procedure Laterality Date   • BREAST BIOPSY     • CERVICAL DISCECTOMY ANTERIOR     • CHOLECYSTECTOMY     • SALPINGO OOPHORECTOMY Left    • TOTAL ABDOMINAL HYSTERECTOMY WITH SALPINGO OOPHORECTOMY Right        Family History   Problem Relation Age of Onset   • Diabetes Father    • Pulmonary embolism Father    • Cancer Father    • Breast cancer Mother    • Diabetes Mother        Social History     Socioeconomic History   • Marital status:    Tobacco Use   • Smoking status: Former   • Smokeless tobacco: Never   Substance and Sexual Activity   • Alcohol use: No   • Drug use: No   • Sexual activity: Yes     Partners: Male     Birth control/protection: Surgical           Objective   Physical Exam  Vitals and nursing note reviewed.   Constitutional:       General: She is not in acute distress.     Appearance: Normal appearance.   HENT:      Head: Normocephalic and atraumatic.      Nose: Nose normal. No congestion or rhinorrhea.   Eyes:      General: No scleral icterus.     Conjunctiva/sclera:  Conjunctivae normal.   Neck:      Comments: No JVD   Cardiovascular:      Rate and Rhythm: Normal rate and regular rhythm.      Heart sounds: No murmur heard.    No friction rub.   Pulmonary:      Effort: Pulmonary effort is normal.      Breath sounds: Normal breath sounds. No wheezing or rales.   Abdominal:      General: Bowel sounds are normal.      Palpations: Abdomen is soft.      Tenderness: There is no abdominal tenderness. There is no guarding or rebound.   Musculoskeletal:         General: No tenderness.      Cervical back: Normal range of motion and neck supple.      Right lower leg: No edema.      Left lower leg: No edema.   Skin:     General: Skin is warm and dry.      Coloration: Skin is not pale.      Findings: No erythema.   Neurological:      General: No focal deficit present.      Mental Status: She is alert and oriented to person, place, and time.      Motor: No weakness.      Coordination: Coordination normal.   Psychiatric:         Mood and Affect: Mood normal.         Behavior: Behavior normal.         Thought Content: Thought content normal.         Procedures           ED Course  ED Course as of 03/31/23 0837   Fri Mar 31, 2023   0613 Have reviewed labs.  Obtain CT scan.  Awaiting radiologist interpretation of that.  We will give Protonix. [DT]   2710 I spoke with Mrs. Oden and her  about findings including the renal cyst and the need to follow that up to make sure its not getting bigger.  She tells me she feels like she is already getting better after the Protonix.  I advised her that peptic ulcer disease would be high on the differential.  We will start her on Protonix.  She will follow-up with her primary care provider [DT]      ED Course User Index  [DT] Giovanni Riggs MD                                           Medical Decision Making  Please refer to emergency department course notes.  I reviewed records.  Obtain CT scan.  Gave medication and obtain numerous  labs.    Bloating: acute illness or injury  Diffuse abdominal pain: acute illness or injury that poses a threat to life or bodily functions  Renal cyst, right: undiagnosed new problem with uncertain prognosis  Amount and/or Complexity of Data Reviewed  Labs: ordered.  Radiology: ordered. Decision-making details documented in ED Course.      Risk  Prescription drug management.          Final diagnoses:   Renal cyst, right   Diffuse abdominal pain   Bloating       ED Disposition  ED Disposition     ED Disposition   Discharge    Condition   Stable    Comment   --             Tara Cordova, [place]  [address] [place] [address]  [phone]               Medication List      New Prescriptions    pantoprazole 20 MG EC tablet  Commonly known as: PROTONIX  Take 1 tablet by mouth Daily.           Where to Get Your Medications      These medications were sent to [id number] - [place], [place] - [address] - [phone]  - [phone] FX  [address] [facility] [address]    Phone: [phone]   · pantoprazole 20 MG EC tablet          Giovanni Riggs MD  03/31/23 0837

## 2023-03-31 NOTE — DISCHARGE INSTRUCTIONS
I have sent in a prescription for Protonix.  Take 1 pill every morning.  If her insurance does not cover this then get Prilosec which is available over-the-counter.  Take that every day.  Follow-up the cyst on your kidney with your primary care provider.